# Patient Record
Sex: FEMALE | Race: WHITE | NOT HISPANIC OR LATINO | Employment: FULL TIME | RURAL
[De-identification: names, ages, dates, MRNs, and addresses within clinical notes are randomized per-mention and may not be internally consistent; named-entity substitution may affect disease eponyms.]

---

## 2017-01-11 ENCOUNTER — OFFICE VISIT (OUTPATIENT)
Dept: RETAIL CLINIC | Facility: CLINIC | Age: 42
End: 2017-01-11

## 2017-01-11 VITALS
WEIGHT: 293 LBS | DIASTOLIC BLOOD PRESSURE: 84 MMHG | HEIGHT: 62 IN | OXYGEN SATURATION: 97 % | TEMPERATURE: 98.2 F | BODY MASS INDEX: 53.92 KG/M2 | HEART RATE: 72 BPM | RESPIRATION RATE: 18 BRPM | SYSTOLIC BLOOD PRESSURE: 122 MMHG

## 2017-01-11 DIAGNOSIS — R14.2 BELCHING: ICD-10-CM

## 2017-01-11 DIAGNOSIS — H66.001 ACUTE SUPPURATIVE OTITIS MEDIA OF RIGHT EAR WITHOUT SPONTANEOUS RUPTURE OF TYMPANIC MEMBRANE, RECURRENCE NOT SPECIFIED: Primary | ICD-10-CM

## 2017-01-11 DIAGNOSIS — J06.9 ACUTE URI: ICD-10-CM

## 2017-01-11 PROCEDURE — 99213 OFFICE O/P EST LOW 20 MIN: CPT | Performed by: NURSE PRACTITIONER

## 2017-01-11 RX ORDER — RANITIDINE 150 MG/1
150 CAPSULE ORAL 2 TIMES DAILY
Qty: 20 CAPSULE | Refills: 0 | Status: SHIPPED | OUTPATIENT
Start: 2017-01-11 | End: 2018-01-11

## 2017-01-11 RX ORDER — AMOXICILLIN 500 MG/1
1000 CAPSULE ORAL 2 TIMES DAILY
Qty: 40 CAPSULE | Refills: 0 | Status: SHIPPED | OUTPATIENT
Start: 2017-01-11 | End: 2017-05-10

## 2017-01-11 NOTE — PATIENT INSTRUCTIONS
Hiatal Hernia  A hiatal hernia occurs when part of your stomach slides above the muscle that separates your abdomen from your chest (diaphragm). You can be born with a hiatal hernia (congenital), or it may develop over time. In almost all cases of hiatal hernia, only the top part of the stomach pushes through.   Many people have a hiatal hernia with no symptoms. The larger the hernia, the more likely that you will have symptoms. In some cases, a hiatal hernia allows stomach acid to flow back into the tube that carries food from your mouth to your stomach (esophagus). This may cause heartburn symptoms. Severe heartburn symptoms may mean you have developed a condition called gastroesophageal reflux disease (GERD).   CAUSES   Hiatal hernias are caused by a weakness in the opening (hiatus) where your esophagus passes through your diaphragm to attach to the upper part of your stomach. You may be born with a weakness in your hiatus, or a weakness can develop.  RISK FACTORS  Older age is a major risk factor for a hiatal hernia. Anything that increases pressure on your diaphragm can also increase your risk of a hiatal hernia. This includes:  · Pregnancy.  · Excess weight.  · Frequent constipation.  SIGNS AND SYMPTOMS   People with a hiatal hernia often have no symptoms. If symptoms develop, they are almost always caused by GERD. They may include:  · Heartburn.  · Belching.  · Indigestion.  · Trouble swallowing.  · Coughing or wheezing.   · Sore throat.  · Hoarseness.  · Chest pain.  DIAGNOSIS   A hiatal hernia is sometimes found during an exam for another problem. Your health care provider may suspect a hiatal hernia if you have symptoms of GERD. Tests may be done to diagnose GERD. These may include:  · X-rays of your stomach or chest.  · An upper gastrointestinal (GI) series. This is an X-ray exam of your GI tract involving the use of a chalky liquid that you swallow. The liquid shows up clearly on the X-ray.  · Endoscopy.  This is a procedure to look into your stomach using a thin, flexible tube that has a tiny camera and light on the end of it.  TREATMENT   If you have no symptoms, you may not need treatment. If you have symptoms, treatment may include:  · Dietary and lifestyle changes to help reduce GERD symptoms.  · Medicines. These may include:    Over-the-counter antacids.    Medicines that make your stomach empty more quickly.    Medicines that block the production of stomach acid (H2 blockers).    Stronger medicines to reduce stomach acid (proton pump inhibitors).  · You may need surgery to repair the hernia if other treatments are not helping.  HOME CARE INSTRUCTIONS   · Take all medicines as directed by your health care provider.  · Quit smoking, if you smoke.  · Try to achieve and maintain a healthy body weight.  · Eat frequent small meals instead of three large meals a day. This keeps your stomach from getting too full.    Eat slowly.    Do not lie down right after eating.     Do not eat 1-2 hours before bed.    · Do not drink beverages with caffeine. These include cola, coffee, cocoa, and tea.  · Do not drink alcohol.  · Avoid foods that can make symptoms of GERD worse. These may include:    Fatty foods.    Citrus fruits.    Other foods and drinks that contain acid.  · Avoid putting pressure on your belly. Anything that puts pressure on your belly increases the amount of acid that may be pushed up into your esophagus.      Avoid bending over, especially after eating.    Raise the head of your bed by putting blocks under the legs. This keeps your head and esophagus higher than your stomach.    Do not wear tight clothing around your chest or stomach.    Try not to strain when having a bowel movement, when urinating, or when lifting heavy objects.  SEEK MEDICAL CARE IF:  · Your symptoms are not controlled with medicines or lifestyle changes.  · You are having trouble swallowing.  · You have coughing or wheezing that will not  go away.  SEEK IMMEDIATE MEDICAL CARE IF:  · Your pain is getting worse.  · Your pain spreads to your arms, neck, jaw, teeth, or back.  · You have shortness of breath.  · You sweat for no reason.  · You feel sick to your stomach (nauseous) or vomit.  · You vomit blood.  · You have bright red blood in your stools.  · You have black, tarry stools.       This information is not intended to replace advice given to you by your health care provider. Make sure you discuss any questions you have with your health care provider.     Document Released: 03/09/2005 Document Revised: 01/08/2016 Document Reviewed: 12/05/2014  [employer] Interactive Patient Education ©2016 Elsevier Inc.  Otitis Media, Adult  Otitis media is redness, soreness, and inflammation of the middle ear. Otitis media may be caused by allergies or, most commonly, by infection. Often it occurs as a complication of the common cold.  SIGNS AND SYMPTOMS  Symptoms of otitis media may include:  · Earache.  · Fever.  · Ringing in your ear.  · Headache.  · Leakage of fluid from the ear.  DIAGNOSIS  To diagnose otitis media, your health care provider will examine your ear with an otoscope. This is an instrument that allows your health care provider to see into your ear in order to examine your eardrum. Your health care provider also will ask you questions about your symptoms.  TREATMENT   Typically, otitis media resolves on its own within 3-5 days. Your health care provider may prescribe medicine to ease your symptoms of pain. If otitis media does not resolve within 5 days or is recurrent, your health care provider may prescribe antibiotic medicines if he or she suspects that a bacterial infection is the cause.  HOME CARE INSTRUCTIONS   · If you were prescribed an antibiotic medicine, finish it all even if you start to feel better.  · Take medicines only as directed by your health care provider.  · Keep all follow-up visits as directed by your health care  provider.  SEEK MEDICAL CARE IF:  · You have otitis media only in one ear, or bleeding from your nose, or both.  · You notice a lump on your neck.  · You are not getting better in 3-5 days.  · You feel worse instead of better.  SEEK IMMEDIATE MEDICAL CARE IF:   · You have pain that is not controlled with medicine.  · You have swelling, redness, or pain around your ear or stiffness in your neck.  · You notice that part of your face is paralyzed.  · You notice that the bone behind your ear (mastoid) is tender when you touch it.  MAKE SURE YOU:   · Understand these instructions.  · Will watch your condition.  · Will get help right away if you are not doing well or get worse.     This information is not intended to replace advice given to you by your health care provider. Make sure you discuss any questions you have with your health care provider.     Document Released: 09/22/2005 Document Revised: 01/08/2016 Document Reviewed: 07/15/2014  Elsevier Interactive Patient Education ©2016 Elsevier Inc.

## 2017-01-11 NOTE — PROGRESS NOTES
Subjective   Ashley John is a 41 y.o. female.     HPI Comments: Also reports some chest pain and palpitations that she contributed to dehydration 2 days ago because she only drinks coffee and mountain dew.  Reports that she drank only water the last two days and that the symptoms subsided.      Earache    There is pain in the right ear. This is a new problem. Episode onset: x 2-3 days. The problem occurs every few hours. The problem has been unchanged. Maximum temperature: low grade. The fever has been present for 1 to 2 days. The pain is mild. Associated symptoms include abdominal pain and coughing ( mild). Pertinent negatives include no diarrhea, ear discharge, headaches, hearing loss, neck pain, rash, rhinorrhea, sore throat or vomiting. She has tried nothing for the symptoms.   Abdominal Pain   This is a new problem. Episode onset: x 2-3 days. The onset quality is gradual. Episode frequency: mainlly at night. The problem has been unchanged. The pain is located in the epigastric region. The pain is mild. The quality of the pain is aching. The abdominal pain does not radiate. Associated symptoms include belching, a fever ( low grade) and myalgias (x 2-3 days). Pertinent negatives include no anorexia, arthralgias, constipation, diarrhea, dysuria, flatus, frequency, headaches, hematuria, melena, nausea, vomiting or weight loss. Associated symptoms comments: Recently had stomach virus 2 weeks ago and felt like she was back to normal  . Exacerbated by: eating large meals. The pain is relieved by nothing. She has tried nothing for the symptoms.        The following portions of the patient's history were reviewed and updated as appropriate: allergies, current medications, past medical history and past social history.    Review of Systems   Constitutional: Positive for fever ( low grade). Negative for activity change, appetite change and weight loss.   HENT: Positive for congestion ( mild) and ear pain. Negative for  "ear discharge, hearing loss, rhinorrhea, sinus pressure, sneezing, sore throat and trouble swallowing.    Eyes: Negative.    Respiratory: Positive for cough ( mild). Negative for chest tightness and wheezing.    Cardiovascular: Positive for palpitations ( 2 days ago). Negative for chest pain and leg swelling.   Gastrointestinal: Positive for abdominal pain. Negative for anorexia, blood in stool, constipation, diarrhea, flatus, melena, nausea and vomiting. Abdominal distention:  reports bloating at night when belching occurs  or after eating.   Genitourinary: Negative for dysuria, frequency and hematuria.   Musculoskeletal: Positive for myalgias (x 2-3 days). Negative for arthralgias, neck pain and neck stiffness.   Skin: Negative.  Negative for rash.   Neurological: Positive for weakness. Negative for dizziness and headaches.   Hematological: Negative for adenopathy.   Psychiatric/Behavioral: Negative.        Objective    Visit Vitals   • /84   • Pulse 72   • Temp 98.2 °F (36.8 °C) (Tympanic)   • Resp 18   • Ht 62\" (157.5 cm)   • Wt (!) 332 lb (151 kg)   • LMP 12/31/2016 (LMP Unknown)   • SpO2 97%   • Breastfeeding No   • BMI 60.72 kg/m2       Physical Exam   Constitutional: She is oriented to person, place, and time. She appears well-developed and well-nourished. No distress.   HENT:   Head: Normocephalic and atraumatic.   Right Ear: Ear canal normal. Tympanic membrane is erythematous ( dull).   Left Ear: Tympanic membrane and ear canal normal.   Nose: Nose normal. Right sinus exhibits no maxillary sinus tenderness and no frontal sinus tenderness. Left sinus exhibits no maxillary sinus tenderness and no frontal sinus tenderness.   Mouth/Throat: Uvula is midline and mucous membranes are normal. Posterior oropharyngeal erythema ( throat injected, +PND) present.   Eyes: Conjunctivae are normal.   Neck: Normal range of motion. Neck supple.   Cardiovascular: Normal rate and regular rhythm.    Auscultated for full " minute     Pulmonary/Chest: Effort normal. She has no wheezes. She has no rales.   Loose, slightly congested cough       Abdominal: Soft. Bowel sounds are normal. There is tenderness ( mild TTP over epigastric region). There is no rebound and no guarding.   Lymphadenopathy:     She has no cervical adenopathy.   Neurological: She is alert and oriented to person, place, and time.   Psychiatric: She has a normal mood and affect. Her behavior is normal.   Nursing note and vitals reviewed.      Assessment/Plan   Ashley was seen today for earache.    Diagnoses and all orders for this visit:    Acute suppurative otitis media of right ear without spontaneous rupture of tympanic membrane, recurrence not specified  -     amoxicillin (AMOXIL) 500 MG capsule; Take 2 capsules by mouth 2 (Two) Times a Day.    Acute URI    Belching  -     ranitidine (ZANTAC) 150 MG capsule; Take 1 capsule by mouth 2 (Two) Times a Day.      Patient was in a hurry to leave but did stress to her that I was unable to r/o any cardiac cause of her symptoms and if they persisted or worsened to go to ER for evaluation and she verbalized understanding.  Also discussed the possibility of hiatal hernia and need for f/u with PCP.      Push fluids  Rest  [place], BRAT diet for the next week or two.    Do not eat/drink within 2-3 hours of lying down at night.  Limit caffeine consumption.      Recommended Mucinex OTC for URI/chest congestion symptoms.     RTW: Today

## 2017-01-11 NOTE — MR AVS SNAPSHOT
Ashley John   1/11/2017 11:00 AM   Office Visit    Dept Phone:  [phone]   Encounter #:  [id number]    Provider:  [facility]   Department:  [facility]                Your Full Care Plan              Today's Medication Changes          These changes are accurate as of: 1/11/17 12:16 PM.  If you have any questions, ask your nurse or doctor.               New Medication(s)Ordered:     amoxicillin 500 MG capsule   Commonly known as:  AMOXIL   Take 2 capsules by mouth 2 (Two) Times a Day.       ranitidine 150 MG capsule   Commonly known as:  ZANTAC   Take 1 capsule by mouth 2 (Two) Times a Day.            Where to Get Your Medications      These medications were sent to [facility] [address] [place] [address] - [phone] [facility] [phone] [address] [address]     Phone:  [phone]     amoxicillin 500 MG capsule    ranitidine 150 MG capsule                  Your Updated Medication List          This list is accurate as of: 1/11/17 12:16 PM.  Always use your most recent med list.                amoxicillin 500 MG capsule   Commonly known as:  AMOXIL   Take 2 capsules by mouth 2 (Two) Times a Day.       ranitidine 150 MG capsule   Commonly known as:  ZANTAC   Take 1 capsule by mouth 2 (Two) Times a Day.               You Were Diagnosed With        Codes Comments    Acute URI    -  Primary ICD-10-CM: J06.9  ICD-9-CM: 465.9     Acute suppurative otitis media of right ear without spontaneous rupture of tympanic membrane, recurrence not specified     ICD-10-CM: H66.001  ICD-9-CM: 382.00     Belching     ICD-10-CM: R14.2  ICD-9-CM: 787.3       Instructions    Hiatal Hernia  A hiatal hernia occurs when part of your stomach slides above the muscle that separates your abdomen from your chest (diaphragm). You can be born with a hiatal hernia (congenital), or it may develop over time. In almost all cases of hiatal hernia, only  the top part of the stomach pushes through.   Many people have a hiatal hernia with no symptoms. The larger the hernia, the more likely that you will have symptoms. In some cases, a hiatal hernia allows stomach acid to flow back into the tube that carries food from your mouth to your stomach (esophagus). This may cause heartburn symptoms. Severe heartburn symptoms may mean you have developed a condition called gastroesophageal reflux disease (GERD).   CAUSES   Hiatal hernias are caused by a weakness in the opening (hiatus) where your esophagus passes through your diaphragm to attach to the upper part of your stomach. You may be born with a weakness in your hiatus, or a weakness can develop.  RISK FACTORS  Older age is a major risk factor for a hiatal hernia. Anything that increases pressure on your diaphragm can also increase your risk of a hiatal hernia. This includes:  · Pregnancy.  · Excess weight.  · Frequent constipation.  SIGNS AND SYMPTOMS   People with a hiatal hernia often have no symptoms. If symptoms develop, they are almost always caused by GERD. They may include:  · Heartburn.  · Belching.  · Indigestion.  · Trouble swallowing.  · Coughing or wheezing.   · Sore throat.  · Hoarseness.  · Chest pain.  DIAGNOSIS   A hiatal hernia is sometimes found during an exam for another problem. Your health care provider may suspect a hiatal hernia if you have symptoms of GERD. Tests may be done to diagnose GERD. These may include:  · X-rays of your stomach or chest.  · An upper gastrointestinal (GI) series. This is an X-ray exam of your GI tract involving the use of a chalky liquid that you swallow. The liquid shows up clearly on the X-ray.  · Endoscopy. This is a procedure to look into your stomach using a thin, flexible tube that has a tiny camera and light on the end of it.  TREATMENT   If you have no symptoms, you may not need treatment. If you have symptoms, treatment may include:  · Dietary and lifestyle  changes to help reduce GERD symptoms.  · Medicines. These may include:    Over-the-counter antacids.    Medicines that make your stomach empty more quickly.    Medicines that block the production of stomach acid (H2 blockers).    Stronger medicines to reduce stomach acid (proton pump inhibitors).  · You may need surgery to repair the hernia if other treatments are not helping.  HOME CARE INSTRUCTIONS   · Take all medicines as directed by your health care provider.  · Quit smoking, if you smoke.  · Try to achieve and maintain a healthy body weight.  · Eat frequent small meals instead of three large meals a day. This keeps your stomach from getting too full.    Eat slowly.    Do not lie down right after eating.     Do not eat 1-2 hours before bed.    · Do not drink beverages with caffeine. These include cola, coffee, cocoa, and tea.  · Do not drink alcohol.  · Avoid foods that can make symptoms of GERD worse. These may include:    Fatty foods.    Citrus fruits.    Other foods and drinks that contain acid.  · Avoid putting pressure on your belly. Anything that puts pressure on your belly increases the amount of acid that may be pushed up into your esophagus.      Avoid bending over, especially after eating.    Raise the head of your bed by putting blocks under the legs. This keeps your head and esophagus higher than your stomach.    Do not wear tight clothing around your chest or stomach.    Try not to strain when having a bowel movement, when urinating, or when lifting heavy objects.  SEEK MEDICAL CARE IF:  · Your symptoms are not controlled with medicines or lifestyle changes.  · You are having trouble swallowing.  · You have coughing or wheezing that will not go away.  SEEK IMMEDIATE MEDICAL CARE IF:  · Your pain is getting worse.  · Your pain spreads to your arms, neck, jaw, teeth, or back.  · You have shortness of breath.  · You sweat for no reason.  · You feel sick to your stomach (nauseous) or vomit.  · You  vomit blood.  · You have bright red blood in your stools.  · You have black, tarry stools.       This information is not intended to replace advice given to you by your health care provider. Make sure you discuss any questions you have with your health care provider.     Document Released: 03/09/2005 Document Revised: 01/08/2016 Document Reviewed: 12/05/2014  [employer] Interactive Patient Education ©2016 Elsevier Inc.  Otitis Media, Adult  Otitis media is redness, soreness, and inflammation of the middle ear. Otitis media may be caused by allergies or, most commonly, by infection. Often it occurs as a complication of the common cold.  SIGNS AND SYMPTOMS  Symptoms of otitis media may include:  · Earache.  · Fever.  · Ringing in your ear.  · Headache.  · Leakage of fluid from the ear.  DIAGNOSIS  To diagnose otitis media, your health care provider will examine your ear with an otoscope. This is an instrument that allows your health care provider to see into your ear in order to examine your eardrum. Your health care provider also will ask you questions about your symptoms.  TREATMENT   Typically, otitis media resolves on its own within 3-5 days. Your health care provider may prescribe medicine to ease your symptoms of pain. If otitis media does not resolve within 5 days or is recurrent, your health care provider may prescribe antibiotic medicines if he or she suspects that a bacterial infection is the cause.  HOME CARE INSTRUCTIONS   · If you were prescribed an antibiotic medicine, finish it all even if you start to feel better.  · Take medicines only as directed by your health care provider.  · Keep all follow-up visits as directed by your health care provider.  SEEK MEDICAL CARE IF:  · You have otitis media only in one ear, or bleeding from your nose, or both.  · You notice a lump on your neck.  · You are not getting better in 3-5 days.  · You feel worse instead of better.  SEEK IMMEDIATE MEDICAL CARE IF:   · You  have pain that is not controlled with medicine.  · You have swelling, redness, or pain around your ear or stiffness in your neck.  · You notice that part of your face is paralyzed.  · You notice that the bone behind your ear (mastoid) is tender when you touch it.  MAKE SURE YOU:   · Understand these instructions.  · Will watch your condition.  · Will get help right away if you are not doing well or get worse.     This information is not intended to replace advice given to you by your health care provider. Make sure you discuss any questions you have with your health care provider.     Document Released: 2005 Document Revised: 2016 Document Reviewed: 07/15/2014  [employer] Interactive Patient Education © Elsevier Inc.       Patient Instructions History      Upcoming Appointments     Visit Type Date Time Department    OFFICE VISIT 2017 11:00 AM [facility] [place]      [facility] Signup     [employer] allows you to send messages to your doctor, view your test results, renew your prescriptions, schedule appointments, and more. To sign up, go to [employer] and click on the Sign Up Now link in the New User? box. Enter your [facility] Activation Code exactly as it appears below along with the last four digits of your Social Security Number and your Date of Birth () to complete the sign-up process. If you do not sign up before the expiration date, you must request a new code.    [facility] Activation Code: [email or web address]  Expires: 2017 12:16 PM    If you have questions, you can email [email or web address] or call [phone] to talk to our [facility] staff. Remember, [facility] is NOT to be used for urgent needs. For medical emergencies, dial 911.               Other Info from Your Visit           Allergies     No Known Allergies      Reason for Visit     Earache ears stopped up bloated when she eats belching when she lays down tired weak has heart beat weird drank water  "went away       Vital Signs     Blood Pressure Pulse Temperature Respirations Height Weight    122/84 72 98.2 °F (36.8 °C) (Tympanic) 18 62\" (157.5 cm) 332 lb (151 kg)    Last Menstrual Period Oxygen Saturation Breastfeeding? Body Mass Index Smoking Status       12/31/2016 (LMP Unknown) 97% No 60.72 kg/m2 Never Smoker       Problems and Diagnoses Noted     Acute upper respiratory infection    -  Primary    Middle ear infection        Belching            "

## 2017-01-11 NOTE — LETTER
January 11, 2017     Patient: Ashley John   YOB: 1975   Date of Visit: 1/11/2017       To Whom It May Concern:    It is my medical opinion that Ashley John may return to work on today.           Sincerely,        RENZO Gilman    PROVIDER [facility]    CC: No Recipients

## 2017-05-10 ENCOUNTER — OFFICE VISIT (OUTPATIENT)
Dept: OBSTETRICS AND GYNECOLOGY | Facility: CLINIC | Age: 42
End: 2017-05-10

## 2017-05-10 VITALS
SYSTOLIC BLOOD PRESSURE: 109 MMHG | HEART RATE: 86 BPM | HEIGHT: 62 IN | BODY MASS INDEX: 53.92 KG/M2 | WEIGHT: 293 LBS | DIASTOLIC BLOOD PRESSURE: 74 MMHG

## 2017-05-10 DIAGNOSIS — Z01.419 WELL WOMAN EXAM WITH ROUTINE GYNECOLOGICAL EXAM: Primary | ICD-10-CM

## 2017-05-10 DIAGNOSIS — E66.01 MORBID OBESITY WITH BMI OF 50.0-59.9, ADULT (HCC): Chronic | ICD-10-CM

## 2017-05-10 DIAGNOSIS — Z12.31 SCREENING MAMMOGRAM, ENCOUNTER FOR: ICD-10-CM

## 2017-05-10 PROCEDURE — 88142 CYTOPATH C/V THIN LAYER: CPT | Performed by: OBSTETRICS & GYNECOLOGY

## 2017-05-10 PROCEDURE — 87624 HPV HI-RISK TYP POOLED RSLT: CPT | Performed by: OBSTETRICS & GYNECOLOGY

## 2017-05-10 PROCEDURE — 99386 PREV VISIT NEW AGE 40-64: CPT | Performed by: OBSTETRICS & GYNECOLOGY

## 2017-05-10 RX ORDER — CETIRIZINE HYDROCHLORIDE 10 MG/1
10 TABLET ORAL DAILY
COMMUNITY
End: 2017-10-20 | Stop reason: SDUPTHER

## 2017-05-10 RX ORDER — LANOLIN ALCOHOL/MO/W.PET/CERES
1000 CREAM (GRAM) TOPICAL DAILY
COMMUNITY
End: 2017-10-20

## 2017-05-15 LAB
LAB AP CASE REPORT: NORMAL
LAB AP GYN ADDITIONAL INFORMATION: NORMAL
LAB AP GYN OTHER FINDINGS: NORMAL
Lab: NORMAL
PATH INTERP SPEC-IMP: NORMAL
STAT OF ADQ CVX/VAG CYTO-IMP: NORMAL

## 2017-05-17 LAB — HPV I/H RISK 4 DNA CVX QL PROBE+SIG AMP: NEGATIVE

## 2017-05-18 ENCOUNTER — OFFICE VISIT (OUTPATIENT)
Dept: RETAIL CLINIC | Facility: CLINIC | Age: 42
End: 2017-05-18

## 2017-05-18 VITALS
WEIGHT: 293 LBS | TEMPERATURE: 98.8 F | BODY MASS INDEX: 53.92 KG/M2 | OXYGEN SATURATION: 96 % | RESPIRATION RATE: 18 BRPM | SYSTOLIC BLOOD PRESSURE: 122 MMHG | DIASTOLIC BLOOD PRESSURE: 86 MMHG | HEART RATE: 89 BPM | HEIGHT: 62 IN

## 2017-05-18 DIAGNOSIS — H00.016 HORDEOLUM EXTERNUM OF LEFT EYE, UNSPECIFIED EYELID: ICD-10-CM

## 2017-05-18 DIAGNOSIS — J20.9 ACUTE BRONCHITIS, UNSPECIFIED ORGANISM: ICD-10-CM

## 2017-05-18 DIAGNOSIS — J01.91 ACUTE RECURRENT SINUSITIS, UNSPECIFIED LOCATION: Primary | ICD-10-CM

## 2017-05-18 PROCEDURE — 99213 OFFICE O/P EST LOW 20 MIN: CPT | Performed by: NURSE PRACTITIONER

## 2017-05-18 PROCEDURE — 96372 THER/PROPH/DIAG INJ SC/IM: CPT | Performed by: NURSE PRACTITIONER

## 2017-05-18 PROCEDURE — 94640 AIRWAY INHALATION TREATMENT: CPT | Performed by: NURSE PRACTITIONER

## 2017-05-18 RX ORDER — ALBUTEROL SULFATE 2.5 MG/3ML
2.5 SOLUTION RESPIRATORY (INHALATION) EVERY 4 HOURS PRN
Qty: 75 ML | Refills: 0 | Status: SHIPPED | OUTPATIENT
Start: 2017-05-18 | End: 2018-08-22

## 2017-05-18 RX ORDER — DEXAMETHASONE SODIUM PHOSPHATE 4 MG/ML
8 INJECTION, SOLUTION INTRA-ARTICULAR; INTRALESIONAL; INTRAMUSCULAR; INTRAVENOUS; SOFT TISSUE ONCE
Status: COMPLETED | OUTPATIENT
Start: 2017-05-18 | End: 2017-05-18

## 2017-05-18 RX ORDER — GUAIFENESIN AND DEXTROMETHORPHAN HYDROBROMIDE 600; 30 MG/1; MG/1
2 TABLET, EXTENDED RELEASE ORAL EVERY 12 HOURS SCHEDULED
Qty: 28 TABLET | Refills: 0 | Status: SHIPPED | OUTPATIENT
Start: 2017-05-18 | End: 2017-10-20

## 2017-05-18 RX ORDER — ALBUTEROL SULFATE 2.5 MG/3ML
2.5 SOLUTION RESPIRATORY (INHALATION) ONCE
Status: COMPLETED | OUTPATIENT
Start: 2017-05-18 | End: 2017-05-18

## 2017-05-18 RX ORDER — ALBUTEROL SULFATE 90 UG/1
2 AEROSOL, METERED RESPIRATORY (INHALATION) EVERY 4 HOURS PRN
Qty: 18 G | Refills: 0 | Status: SHIPPED | OUTPATIENT
Start: 2017-05-18 | End: 2018-10-08 | Stop reason: SDUPTHER

## 2017-05-18 RX ORDER — ALBUTEROL SULFATE 90 UG/1
2 AEROSOL, METERED RESPIRATORY (INHALATION) EVERY 4 HOURS PRN
COMMUNITY
End: 2017-05-18 | Stop reason: SDUPTHER

## 2017-05-18 RX ORDER — ERYTHROMYCIN 5 MG/G
OINTMENT OPHTHALMIC 3 TIMES DAILY
Qty: 1 G | Refills: 0 | Status: SHIPPED | OUTPATIENT
Start: 2017-05-18 | End: 2017-05-25

## 2017-05-18 RX ADMIN — DEXAMETHASONE SODIUM PHOSPHATE 8 MG: 4 INJECTION, SOLUTION INTRA-ARTICULAR; INTRALESIONAL; INTRAMUSCULAR; INTRAVENOUS; SOFT TISSUE at 19:02

## 2017-05-18 RX ADMIN — ALBUTEROL SULFATE 2.5 MG: 2.5 SOLUTION RESPIRATORY (INHALATION) at 19:03

## 2017-08-11 ENCOUNTER — LAB (OUTPATIENT)
Dept: LAB | Facility: CLINIC | Age: 42
End: 2017-08-11

## 2017-08-11 DIAGNOSIS — Z00.00 ENCOUNTER FOR SCREENING AND PREVENTATIVE CARE: ICD-10-CM

## 2017-08-11 LAB
25(OH)D3 SERPL-MCNC: 16.7 NG/ML (ref 30–100)
ALBUMIN SERPL-MCNC: 3.8 G/DL (ref 3.4–4.8)
ALBUMIN/GLOB SERPL: 1.4 G/DL (ref 1.1–1.8)
ALP SERPL-CCNC: 112 U/L (ref 38–126)
ALT SERPL W P-5'-P-CCNC: 31 U/L (ref 9–52)
ANION GAP SERPL CALCULATED.3IONS-SCNC: 9 MMOL/L (ref 5–15)
ARTICHOKE IGE QN: 143 MG/DL (ref 1–129)
AST SERPL-CCNC: 18 U/L (ref 14–36)
BASOPHILS # BLD AUTO: 0.02 10*3/MM3 (ref 0–0.2)
BASOPHILS NFR BLD AUTO: 0.2 % (ref 0–2)
BILIRUB SERPL-MCNC: 0.5 MG/DL (ref 0.2–1.3)
BUN BLD-MCNC: 11 MG/DL (ref 7–21)
BUN/CREAT SERPL: 17.7 (ref 7–25)
CALCIUM SPEC-SCNC: 9.4 MG/DL (ref 8.4–10.2)
CHLORIDE SERPL-SCNC: 102 MMOL/L (ref 95–110)
CHOLEST SERPL-MCNC: 196 MG/DL (ref 0–199)
CO2 SERPL-SCNC: 29 MMOL/L (ref 22–31)
CREAT BLD-MCNC: 0.62 MG/DL (ref 0.5–1)
DEPRECATED RDW RBC AUTO: 42 FL (ref 36.4–46.3)
EOSINOPHIL # BLD AUTO: 0.1 10*3/MM3 (ref 0–0.7)
EOSINOPHIL NFR BLD AUTO: 1 % (ref 0–7)
ERYTHROCYTE [DISTWIDTH] IN BLOOD BY AUTOMATED COUNT: 12.6 % (ref 11.5–14.5)
FOLATE SERPL-MCNC: 9.81 NG/ML (ref 2.76–21)
GFR SERPL CREATININE-BSD FRML MDRD: 107 ML/MIN/1.73 (ref 58–135)
GLOBULIN UR ELPH-MCNC: 2.8 GM/DL (ref 2.3–3.5)
GLUCOSE BLD-MCNC: 94 MG/DL (ref 60–100)
HCT VFR BLD AUTO: 46.2 % (ref 35–45)
HDLC SERPL-MCNC: 47 MG/DL (ref 60–200)
HGB BLD-MCNC: 15.6 G/DL (ref 12–15.5)
IMM GRANULOCYTES # BLD: 0.05 10*3/MM3 (ref 0–0.02)
IMM GRANULOCYTES NFR BLD: 0.5 % (ref 0–0.5)
LDLC/HDLC SERPL: 2.51 {RATIO} (ref 0–3.22)
LYMPHOCYTES # BLD AUTO: 3.16 10*3/MM3 (ref 0.6–4.2)
LYMPHOCYTES NFR BLD AUTO: 30.6 % (ref 10–50)
MCH RBC QN AUTO: 30.8 PG (ref 26.5–34)
MCHC RBC AUTO-ENTMCNC: 33.8 G/DL (ref 31.4–36)
MCV RBC AUTO: 91.3 FL (ref 80–98)
MONOCYTES # BLD AUTO: 0.65 10*3/MM3 (ref 0–0.9)
MONOCYTES NFR BLD AUTO: 6.3 % (ref 0–12)
NEUTROPHILS # BLD AUTO: 6.35 10*3/MM3 (ref 2–8.6)
NEUTROPHILS NFR BLD AUTO: 61.4 % (ref 37–80)
PLATELET # BLD AUTO: 276 10*3/MM3 (ref 150–450)
PMV BLD AUTO: 11.4 FL (ref 8–12)
POTASSIUM BLD-SCNC: 4.4 MMOL/L (ref 3.5–5.1)
PROT SERPL-MCNC: 6.6 G/DL (ref 6.3–8.6)
RBC # BLD AUTO: 5.06 10*6/MM3 (ref 3.77–5.16)
SODIUM BLD-SCNC: 140 MMOL/L (ref 137–145)
TRIGL SERPL-MCNC: 156 MG/DL (ref 20–199)
TSH SERPL DL<=0.05 MIU/L-ACNC: 1.82 MIU/ML (ref 0.46–4.68)
VIT B12 BLD-MCNC: 305 PG/ML (ref 239–931)
WBC NRBC COR # BLD: 10.33 10*3/MM3 (ref 3.2–9.8)

## 2017-08-11 PROCEDURE — 84443 ASSAY THYROID STIM HORMONE: CPT | Performed by: OBSTETRICS & GYNECOLOGY

## 2017-08-11 PROCEDURE — 36415 COLL VENOUS BLD VENIPUNCTURE: CPT | Performed by: FAMILY MEDICINE

## 2017-08-11 PROCEDURE — 82746 ASSAY OF FOLIC ACID SERUM: CPT | Performed by: OBSTETRICS & GYNECOLOGY

## 2017-08-11 PROCEDURE — 80061 LIPID PANEL: CPT | Performed by: OBSTETRICS & GYNECOLOGY

## 2017-08-11 PROCEDURE — 80053 COMPREHEN METABOLIC PANEL: CPT | Performed by: OBSTETRICS & GYNECOLOGY

## 2017-08-11 PROCEDURE — 82306 VITAMIN D 25 HYDROXY: CPT | Performed by: OBSTETRICS & GYNECOLOGY

## 2017-08-11 PROCEDURE — 85025 COMPLETE CBC W/AUTO DIFF WBC: CPT | Performed by: OBSTETRICS & GYNECOLOGY

## 2017-08-11 PROCEDURE — 82607 VITAMIN B-12: CPT | Performed by: OBSTETRICS & GYNECOLOGY

## 2017-08-11 PROCEDURE — 83525 ASSAY OF INSULIN: CPT | Performed by: OBSTETRICS & GYNECOLOGY

## 2017-08-14 LAB — INSULIN SERPL-ACNC: 21.5 UIU/ML (ref 2.6–24.9)

## 2017-10-20 ENCOUNTER — OFFICE VISIT (OUTPATIENT)
Dept: FAMILY MEDICINE CLINIC | Facility: CLINIC | Age: 42
End: 2017-10-20

## 2017-10-20 VITALS
OXYGEN SATURATION: 95 % | BODY MASS INDEX: 53.92 KG/M2 | SYSTOLIC BLOOD PRESSURE: 113 MMHG | HEART RATE: 81 BPM | HEIGHT: 62 IN | TEMPERATURE: 97.2 F | WEIGHT: 293 LBS | DIASTOLIC BLOOD PRESSURE: 76 MMHG

## 2017-10-20 DIAGNOSIS — J01.00 ACUTE MAXILLARY SINUSITIS, RECURRENCE NOT SPECIFIED: Primary | ICD-10-CM

## 2017-10-20 DIAGNOSIS — J20.9 ACUTE BRONCHITIS, UNSPECIFIED ORGANISM: ICD-10-CM

## 2017-10-20 PROCEDURE — 99213 OFFICE O/P EST LOW 20 MIN: CPT | Performed by: NURSE PRACTITIONER

## 2017-10-20 PROCEDURE — 96372 THER/PROPH/DIAG INJ SC/IM: CPT | Performed by: NURSE PRACTITIONER

## 2017-10-20 RX ORDER — TRIAMCINOLONE ACETONIDE 40 MG/ML
80 INJECTION, SUSPENSION INTRA-ARTICULAR; INTRAMUSCULAR ONCE
Status: COMPLETED | OUTPATIENT
Start: 2017-10-20 | End: 2017-10-20

## 2017-10-20 RX ORDER — CETIRIZINE HYDROCHLORIDE 10 MG/1
10 TABLET ORAL DAILY
Qty: 30 TABLET | Refills: 11 | Status: SHIPPED | OUTPATIENT
Start: 2017-10-20

## 2017-10-20 RX ORDER — AMOXICILLIN AND CLAVULANATE POTASSIUM 875; 125 MG/1; MG/1
1 TABLET, FILM COATED ORAL 2 TIMES DAILY
Qty: 20 TABLET | Refills: 0 | Status: SHIPPED | OUTPATIENT
Start: 2017-10-20 | End: 2018-08-22

## 2017-10-20 RX ORDER — FLUCONAZOLE 150 MG/1
150 TABLET ORAL ONCE
Qty: 1 TABLET | Refills: 0 | Status: SHIPPED | OUTPATIENT
Start: 2017-10-20 | End: 2017-10-20

## 2017-10-20 RX ORDER — MONTELUKAST SODIUM 10 MG/1
10 TABLET ORAL NIGHTLY
Qty: 30 TABLET | Refills: 2 | Status: SHIPPED | OUTPATIENT
Start: 2017-10-20 | End: 2018-08-22

## 2017-10-20 RX ADMIN — TRIAMCINOLONE ACETONIDE 80 MG: 40 INJECTION, SUSPENSION INTRA-ARTICULAR; INTRAMUSCULAR at 11:37

## 2017-10-20 NOTE — PATIENT INSTRUCTIONS
Acute Bronchitis  Bronchitis is inflammation of the airways that extend from the windpipe into the lungs (bronchi). The inflammation often causes mucus to develop. This leads to a cough, which is the most common symptom of bronchitis.   In acute bronchitis, the condition usually develops suddenly and goes away over time, usually in a couple weeks. Smoking, allergies, and asthma can make bronchitis worse. Repeated episodes of bronchitis may cause further lung problems.   CAUSES  Acute bronchitis is most often caused by the same virus that causes a cold. The virus can spread from person to person (contagious) through coughing, sneezing, and touching contaminated objects.  SIGNS AND SYMPTOMS   · Cough.    · Fever.    · Coughing up mucus.    · Body aches.    · Chest congestion.    · Chills.    · Shortness of breath.    · Sore throat.    DIAGNOSIS   Acute bronchitis is usually diagnosed through a physical exam. Your health care provider will also ask you questions about your medical history. Tests, such as chest X-rays, are sometimes done to rule out other conditions.   TREATMENT   Acute bronchitis usually goes away in a couple weeks. Oftentimes, no medical treatment is necessary. Medicines are sometimes given for relief of fever or cough. Antibiotic medicines are usually not needed but may be prescribed in certain situations. In some cases, an inhaler may be recommended to help reduce shortness of breath and control the cough. A cool mist vaporizer may also be used to help thin bronchial secretions and make it easier to clear the chest.   HOME CARE INSTRUCTIONS  · Get plenty of rest.    · Drink enough fluids to keep your urine clear or pale yellow (unless you have a medical condition that requires fluid restriction). Increasing fluids may help thin your respiratory secretions (sputum) and reduce chest congestion, and it will prevent dehydration.    · Take medicines only as directed by your health care provider.  · If  you were prescribed an antibiotic medicine, finish it all even if you start to feel better.  · Avoid smoking and secondhand smoke. Exposure to cigarette smoke or irritating chemicals will make bronchitis worse. If you are a smoker, consider using nicotine gum or skin patches to help control withdrawal symptoms. Quitting smoking will help your lungs heal faster.    · Reduce the chances of another bout of acute bronchitis by washing your hands frequently, avoiding people with cold symptoms, and trying not to touch your hands to your mouth, nose, or eyes.    · Keep all follow-up visits as directed by your health care provider.    SEEK MEDICAL CARE IF:  Your symptoms do not improve after 1 week of treatment.   SEEK IMMEDIATE MEDICAL CARE IF:  · You develop an increased fever or chills.    · You have chest pain.    · You have severe shortness of breath.  · You have bloody sputum.    · You develop dehydration.  · You faint or repeatedly feel like you are going to pass out.  · You develop repeated vomiting.  · You develop a severe headache.  MAKE SURE YOU:   · Understand these instructions.  · Will watch your condition.  · Will get help right away if you are not doing well or get worse.     This information is not intended to replace advice given to you by your health care provider. Make sure you discuss any questions you have with your health care provider.     Document Released: 01/25/2006 Document Revised: 01/08/2016 Document Reviewed: 06/10/2014  [employer] Interactive Patient Education ©2017 [employer]  Sinusitis, Adult  Sinusitis is soreness and inflammation of your sinuses. Sinuses are hollow spaces in the bones around your face. Your sinuses are located:  · Around your eyes.  · In the middle of your forehead.  · Behind your nose.  · In your cheekbones.  Your sinuses and nasal passages are lined with a stringy fluid (mucus). Mucus normally drains out of your sinuses. When your nasal tissues become inflamed or  swollen, the mucus can become trapped or blocked so air cannot flow through your sinuses. This allows bacteria, viruses, and funguses to grow, which leads to infection.  Sinusitis can develop quickly and last for 7-10 days (acute) or for more than 12 weeks (chronic). Sinusitis often develops after a cold.  CAUSES  This condition is caused by anything that creates swelling in the sinuses or stops mucus from draining, including:  · Allergies.  · Asthma.  · Bacterial or viral infection.  · Abnormally shaped bones between the nasal passages.  · Nasal growths that contain mucus (nasal polyps).  · Narrow sinus openings.  · Pollutants, such as chemicals or irritants in the air.  · A foreign object stuck in the nose.  · A fungal infection. This is rare.  RISK FACTORS  The following factors may make you more likely to develop this condition:  · Having allergies or asthma.  · Having had a recent cold or respiratory tract infection.  · Having structural deformities or blockages in your nose or sinuses.  · Having a weak immune system.  · Doing a lot of swimming or diving.  · Overusing nasal sprays.  · Smoking.  SYMPTOMS  The main symptoms of this condition are pain and a feeling of pressure around the affected sinuses. Other symptoms include:  · Upper toothache.  · Earache.  · Headache.  · Bad breath.  · Decreased sense of smell and taste.  · A cough that may get worse at night.  · Fatigue.  · Fever.  · Thick drainage from your nose. The drainage is often green and it may contain pus (purulent).  · Stuffy nose or congestion.  · Postnasal drip. This is when extra mucus collects in the throat or back of the nose.  · Swelling and warmth over the affected sinuses.  · Sore throat.  · Sensitivity to light.  DIAGNOSIS  This condition is diagnosed based on symptoms, a medical history, and a physical exam. To find out if your condition is acute or chronic, your health care provider may:  · Look in your nose for signs of nasal  polyps.  · Tap over the affected sinus to check for signs of infection.  · View the inside of your sinuses using an imaging device that has a light attached (endoscope).  If your health care provider suspects that you have chronic sinusitis, you may also:  · Be tested for allergies.  · Have a sample of mucus taken from your nose (nasal culture) and checked for bacteria.  · Have a mucus sample examined to see if your sinusitis is related to an allergy.  If your sinusitis does not respond to treatment and it lasts longer than 8 weeks, you may have an MRI or CT scan to check your sinuses. These scans also help to determine how severe your infection is.  In rare cases, a bone biopsy may be done to rule out more serious types of fungal sinus disease.  TREATMENT  Treatment for sinusitis depends on the cause and whether your condition is chronic or acute. If a virus is causing your sinusitis, your symptoms will go away on their own within 10 days. You may be given medicines to relieve your symptoms, including:  · Topical nasal decongestants. They shrink swollen nasal passages and let mucus drain from your sinuses.  · Antihistamines. These drugs block inflammation that is triggered by allergies. This can help to ease swelling in your nose and sinuses.  · Topical nasal corticosteroids. These are nasal sprays that ease inflammation and swelling in your nose and sinuses.  · Nasal saline washes. These rinses can help to get rid of thick mucus in your nose.  If your condition is caused by bacteria, you will be given an antibiotic medicine. If your condition is caused by a fungus, you will be given an antifungal medicine.  Surgery may be needed to correct underlying conditions, such as narrow nasal passages. Surgery may also be needed to remove polyps.  HOME CARE INSTRUCTIONS  Medicines  · Take, use, or apply over-the-counter and prescription medicines only as told by your health care provider. These may include nasal  sprays.  · If you were prescribed an antibiotic medicine, take it as told by your health care provider. Do not stop taking the antibiotic even if you start to feel better.  Hydrate and Humidify  · Drink enough water to keep your urine clear or pale yellow. Staying hydrated will help to thin your mucus.  · Use a cool mist humidifier to keep the humidity level in your home above 50%.  · Inhale steam for 10-15 minutes, 3-4 times a day or as told by your health care provider. You can do this in the bathroom while a hot shower is running.  · Limit your exposure to cool or dry air.  Rest  · Rest as much as possible.  · Sleep with your head raised (elevated).  · Make sure to get enough sleep each night.  General Instructions  · Apply a warm, moist washcloth to your face 3-4 times a day or as told by your health care provider. This will help with discomfort.  · Wash your hands often with soap and water to reduce your exposure to viruses and other germs. If soap and water are not available, use hand .  · Do not smoke. Avoid being around people who are smoking (secondhand smoke).  · Keep all follow-up visits as told by your health care provider. This is important.  SEEK MEDICAL CARE IF:  · You have a fever.  · Your symptoms get worse.  · Your symptoms do not improve within 10 days.  SEEK IMMEDIATE MEDICAL CARE IF:  · You have a severe headache.  · You have persistent vomiting.  · You have pain or swelling around your face or eyes.  · You have vision problems.  · You develop confusion.  · Your neck is stiff.  · You have trouble breathing.     This information is not intended to replace advice given to you by your health care provider. Make sure you discuss any questions you have with your health care provider.     Document Released: 12/18/2006 Document Revised: 04/10/2017 Document Reviewed: 10/12/2016  [employer] Interactive Patient Education ©2017 [employer]

## 2017-10-20 NOTE — PROGRESS NOTES
"Subjective   Ashley John is a 41 y.o. female.     Sinusitis   This is a new problem. Episode onset: x 2 weeks. The problem has been gradually worsening since onset. Maximum temperature: \"feverish\" about a week ago. Her pain is at a severity of 6/10 (chest). Associated symptoms include chills, congestion, coughing, headaches and sinus pressure ( maxillary). Pertinent negatives include no diaphoresis, ear pain, hoarse voice, neck pain, shortness of breath, sneezing, sore throat or swollen glands. Treatments tried: zyrtec. The treatment provided mild relief.   Cough   This is a new problem. The current episode started in the past 7 days. The problem has been gradually worsening (woke up in the middle of the night last night and had to use inhaler). The problem occurs hourly. The cough is non-productive. Associated symptoms include chest pain ( tight), chills, ear congestion, headaches, nasal congestion, postnasal drip, rhinorrhea ( thick, green) and wheezing ( since last night). Pertinent negatives include no ear pain, heartburn, hemoptysis, myalgias, rash, sore throat, shortness of breath, sweats or weight loss. Fever:  not measured at home. The symptoms are aggravated by lying down. She has tried a beta-agonist inhaler for the symptoms. The treatment provided mild relief. Her past medical history is significant for asthma and bronchitis.        The following portions of the patient's history were reviewed and updated as appropriate: allergies, current medications, past medical history, past social history, past surgical history and problem list.    Review of Systems   Constitutional: Positive for chills. Negative for appetite change, diaphoresis and weight loss. Fever:  not measured at home.   HENT: Positive for congestion, postnasal drip, rhinorrhea ( thick, green), sinus pain and sinus pressure ( maxillary). Negative for ear pain, hoarse voice, sneezing and sore throat.    Eyes: Negative for discharge and " "itching.   Respiratory: Positive for cough, chest tightness and wheezing ( since last night). Negative for hemoptysis and shortness of breath.    Cardiovascular: Positive for chest pain ( tight).   Gastrointestinal: Negative for diarrhea, heartburn, nausea and vomiting.   Musculoskeletal: Negative for myalgias and neck pain.   Skin: Negative for rash.   Neurological: Positive for headaches. Negative for dizziness.   Hematological: Negative for adenopathy.       Objective    /76 (BP Location: Left arm, Patient Position: Sitting, Cuff Size: Adult)  Pulse 81  Temp 97.2 °F (36.2 °C) (Tympanic)   Ht 62\" (157.5 cm)  Wt (!) 323 lb (147 kg)  LMP 09/29/2017  SpO2 95%  BMI 59.08 kg/m2    Physical Exam   Constitutional: She is oriented to person, place, and time. She appears well-developed and well-nourished. No distress.   HENT:   Head: Normocephalic and atraumatic.   Right Ear: Ear canal normal. Tympanic membrane is not erythematous. A middle ear effusion is present.   Left Ear: Tympanic membrane and ear canal normal.   Nose: Mucosal edema ( erythemic, swollen, stuffed) present. Right sinus exhibits maxillary sinus tenderness. Right sinus exhibits no frontal sinus tenderness. Left sinus exhibits maxillary sinus tenderness. Left sinus exhibits no frontal sinus tenderness.   Mouth/Throat: Uvula is midline and mucous membranes are normal. Posterior oropharyngeal erythema ( mildly injected with PND) present.   Eyes: Conjunctivae are normal.   Cardiovascular: Normal rate and regular rhythm.    Pulmonary/Chest: Effort normal. She has wheezes ( decreased aeration with occasional expiratory wheeze and congested cough). She has no rales.   Lymphadenopathy:     She has cervical adenopathy (tender on right side).   Neurological: She is alert and oriented to person, place, and time.   Nursing note and vitals reviewed.      Assessment/Plan   Ashley was seen today for sinusitis, cough and uri.    Diagnoses and all orders for " this visit:    Acute maxillary sinusitis, recurrence not specified  -     amoxicillin-clavulanate (AUGMENTIN) 875-125 MG per tablet; Take 1 tablet by mouth 2 (Two) Times a Day.  -     triamcinolone acetonide (KENALOG-40) injection 80 mg; Inject 2 mL into the shoulder, thigh, or buttocks 1 (One) Time.  -     cetirizine (zyrTEC) 10 MG tablet; Take 1 tablet by mouth Daily.  -     montelukast (SINGULAIR) 10 MG tablet; Take 1 tablet by mouth Every Night.    Acute bronchitis, unspecified organism  -     triamcinolone acetonide (KENALOG-40) injection 80 mg; Inject 2 mL into the shoulder, thigh, or buttocks 1 (One) Time.  -     montelukast (SINGULAIR) 10 MG tablet; Take 1 tablet by mouth Every Night.    Other orders  -     fluconazole (DIFLUCAN) 150 MG tablet; Take 1 tablet by mouth 1 (One) Time for 1 dose.    Push fluids  Rest  Tylenol or Motrin as needed    Continue with Albuterol nebs and/or inhaler for wheezing as needed    Kenalog 80mg IM x 1 in office  Rx for Augmentin, refill of Zyrtec  Will restart Singulair as this has helped significantly with asthma and allergy symptoms in the past.     See PCP or RTC if symptoms persist/worsen    RTW: 10-23-17

## 2018-08-22 ENCOUNTER — OFFICE VISIT (OUTPATIENT)
Dept: FAMILY MEDICINE CLINIC | Facility: CLINIC | Age: 43
End: 2018-08-22

## 2018-08-22 VITALS
WEIGHT: 293 LBS | HEART RATE: 91 BPM | SYSTOLIC BLOOD PRESSURE: 120 MMHG | TEMPERATURE: 98 F | OXYGEN SATURATION: 97 % | HEIGHT: 62 IN | DIASTOLIC BLOOD PRESSURE: 84 MMHG | BODY MASS INDEX: 53.92 KG/M2

## 2018-08-22 DIAGNOSIS — J01.90 ACUTE SINUSITIS, RECURRENCE NOT SPECIFIED, UNSPECIFIED LOCATION: Primary | ICD-10-CM

## 2018-08-22 DIAGNOSIS — R05.9 COUGH: ICD-10-CM

## 2018-08-22 DIAGNOSIS — R50.9 FEVER, UNSPECIFIED FEVER CAUSE: ICD-10-CM

## 2018-08-22 DIAGNOSIS — J20.9 ACUTE BRONCHITIS, UNSPECIFIED ORGANISM: ICD-10-CM

## 2018-08-22 DIAGNOSIS — M77.8 RIGHT WRIST TENDINITIS: ICD-10-CM

## 2018-08-22 PROCEDURE — 96372 THER/PROPH/DIAG INJ SC/IM: CPT | Performed by: NURSE PRACTITIONER

## 2018-08-22 PROCEDURE — 94640 AIRWAY INHALATION TREATMENT: CPT | Performed by: NURSE PRACTITIONER

## 2018-08-22 PROCEDURE — 99214 OFFICE O/P EST MOD 30 MIN: CPT | Performed by: NURSE PRACTITIONER

## 2018-08-22 RX ORDER — AMOXICILLIN AND CLAVULANATE POTASSIUM 875; 125 MG/1; MG/1
1 TABLET, FILM COATED ORAL EVERY 12 HOURS SCHEDULED
Qty: 20 TABLET | Refills: 0 | Status: SHIPPED | OUTPATIENT
Start: 2018-08-22 | End: 2018-10-02

## 2018-08-22 RX ORDER — IBUPROFEN 600 MG/1
TABLET ORAL
Refills: 0 | COMMUNITY
Start: 2018-08-14 | End: 2019-01-15

## 2018-08-22 RX ORDER — CEFTRIAXONE 1 G/1
1 INJECTION, POWDER, FOR SOLUTION INTRAMUSCULAR; INTRAVENOUS ONCE
Status: COMPLETED | OUTPATIENT
Start: 2018-08-22 | End: 2018-08-22

## 2018-08-22 RX ORDER — METHYLPREDNISOLONE 4 MG/1
TABLET ORAL
Qty: 1 EACH | Refills: 0 | Status: SHIPPED | OUTPATIENT
Start: 2018-08-22 | End: 2018-10-02

## 2018-08-22 RX ORDER — OFLOXACIN 3 MG/ML
SOLUTION AURICULAR (OTIC)
Refills: 0 | COMMUNITY
Start: 2018-08-14 | End: 2018-10-02

## 2018-08-22 RX ORDER — ALBUTEROL SULFATE 2.5 MG/3ML
2.5 SOLUTION RESPIRATORY (INHALATION) EVERY 4 HOURS PRN
Qty: 75 ML | Refills: 2 | Status: SHIPPED | OUTPATIENT
Start: 2018-08-22

## 2018-08-22 RX ORDER — ALBUTEROL SULFATE 2.5 MG/3ML
2.5 SOLUTION RESPIRATORY (INHALATION) ONCE
Status: COMPLETED | OUTPATIENT
Start: 2018-08-22 | End: 2018-08-22

## 2018-08-22 RX ADMIN — ALBUTEROL SULFATE 2.5 MG: 2.5 SOLUTION RESPIRATORY (INHALATION) at 09:40

## 2018-08-22 RX ADMIN — CEFTRIAXONE 1 G: 1 INJECTION, POWDER, FOR SOLUTION INTRAMUSCULAR; INTRAVENOUS at 10:09

## 2018-08-22 NOTE — PROGRESS NOTES
"Subjective   Ashley John is a 42 y.o. female.     [facility] Visit    PCP: Dr. Younger    CC: \"nasal congestion, cough, lightheaded, fever\"    Also c/o right wrist pain.  Does repetitive movement at work.  Pain worse at the end of the day.  Chiropractor recommended ibuprofen and wrist splint which has helped somewhat.  Denies injury.     Stopped Singulair because she though she was drying out too much.       Fever    This is a new problem. The current episode started yesterday. The problem occurs intermittently. The maximum temperature noted was 101 to 101.9 F. Associated symptoms include chest pain ( with cough), congestion, coughing, ear pain, headaches, muscle aches, a sore throat and wheezing. Pertinent negatives include no abdominal pain, diarrhea, nausea, rash, sleepiness, urinary pain or vomiting. She has tried NSAIDs (zyrtec, flonase, ofloxacin ear drops from [facility] two weeks ago at onset of symptoms) for the symptoms. The treatment provided mild (with ibuprofen--last dose last night) relief.   Cough   This is a new problem. Episode onset: x 2 weeks. The problem has been gradually worsening. The problem occurs every few minutes. The cough is productive of purulent sputum. Associated symptoms include chest pain ( with cough), ear congestion, ear pain, a fever, headaches, myalgias, nasal congestion, postnasal drip, rhinorrhea ( old blood yesterday, now thick yellow to green), a sore throat, shortness of breath and wheezing. Pertinent negatives include no chills, heartburn, hemoptysis, rash, sweats or weight loss. The symptoms are aggravated by lying down and exercise. Risk factors for lung disease include smoking/tobacco exposure. She has tried a beta-agonist inhaler for the symptoms. The treatment provided mild relief. Her past medical history is significant for bronchitis.   Sinus Problem   This is a new problem. Episode onset: x 2 weeks. Associated symptoms include congestion, coughing, ear " "pain, headaches, shortness of breath and a sore throat. Pertinent negatives include no chills. Treatments tried: zyrtec, flonase. The treatment provided no relief.        The following portions of the patient's history were reviewed and updated as appropriate: allergies, current medications, past medical history, past social history, past surgical history and problem list.    Review of Systems   Constitutional: Positive for fever. Negative for chills and unexpected weight loss.   HENT: Positive for congestion, ear pain, postnasal drip, rhinorrhea ( old blood yesterday, now thick yellow to green) and sore throat.    Respiratory: Positive for cough, shortness of breath and wheezing. Negative for hemoptysis.    Cardiovascular: Positive for chest pain ( with cough).   Gastrointestinal: Negative for abdominal pain, diarrhea, nausea and vomiting.   Genitourinary: Negative for dysuria.   Musculoskeletal: Positive for myalgias.   Skin: Negative for rash.       Objective    /84 (BP Location: Right arm, Patient Position: Sitting, Cuff Size: Adult)   Pulse 91   Temp 98 °F (36.7 °C) (Tympanic)   Ht 157.5 cm (62\")   Wt 136 kg (299 lb)   LMP 07/31/2018   SpO2 97%   Breastfeeding? No   BMI 54.69 kg/m²     Physical Exam   Constitutional: She is oriented to person, place, and time. She appears well-developed and well-nourished. Distressed:  no distress, but does not appear to feel well.   HENT:   Head: Normocephalic and atraumatic.   Right Ear: Ear canal normal. Tympanic membrane is erythematous (dull). A middle ear effusion ( small) is present.   Left Ear: Tympanic membrane and ear canal normal.   Nose: Mucosal edema, rhinorrhea ( purulent) and congestion present. Right sinus exhibits maxillary sinus tenderness and frontal sinus tenderness. Left sinus exhibits maxillary sinus tenderness and frontal sinus tenderness.   Mouth/Throat: Uvula is midline and mucous membranes are normal. Posterior oropharyngeal erythema ( " with pnd) present. No oropharyngeal exudate or posterior oropharyngeal edema.   Eyes: Conjunctivae are normal. Right eye exhibits no discharge. Left eye exhibits no discharge.   Neck: Neck supple.   Cardiovascular: Normal rate and regular rhythm.    Pulmonary/Chest: Effort normal. She has decreased breath sounds in the right upper field, the right middle field, the right lower field, the left upper field, the left middle field and the left lower field. She has wheezes in the right upper field, the right middle field, the right lower field, the left upper field, the left middle field and the left lower field. She has rales in the right upper field, the right middle field, the left upper field and the left middle field.   Albuterol neb given in office. Significantly improved aeration following treatment.    Musculoskeletal:        Right wrist: She exhibits tenderness (along ulunar aspect of wrist). She exhibits normal range of motion and no swelling.   Lymphadenopathy:     She has cervical adenopathy ( shotty).   Neurological: She is alert and oriented to person, place, and time.   Nursing note and vitals reviewed.    Xr Chest Pa & Lateral (in Office)    Result Date: 8/22/2018  1.  Evidence of old granulomatous disease. 2.  Otherwise unremarkable chest. Electronically signed by:  Germán Hall MD  8/22/2018 10:14 AM CDT Workstation: [id number]        Assessment/Plan   Ashley was seen today for nasal congestion, fever, cough and dizziness.    Diagnoses and all orders for this visit:    Acute sinusitis, recurrence not specified, unspecified location  -     amoxicillin-clavulanate (AUGMENTIN) 875-125 MG per tablet; Take 1 tablet by mouth Every 12 (Twelve) Hours.  -     cefTRIAXone (ROCEPHIN) injection 1 g; Inject 1 g into the appropriate muscle as directed by prescriber 1 (One) Time.  -     MethylPREDNISolone (MEDROL, JAVIER,) 4 MG tablet; Take as directed on package instructions.    Cough  -     XR Chest PA & Lateral (In  Office)  -     albuterol (PROVENTIL) nebulizer solution 0.083% 2.5 mg/3mL; Take 2.5 mg by nebulization 1 (One) Time.  -     Nebulizer Treatment    Fever, unspecified fever cause  -     XR Chest PA & Lateral (In Office)    Right wrist tendinitis    Acute bronchitis, unspecified organism  -     amoxicillin-clavulanate (AUGMENTIN) 875-125 MG per tablet; Take 1 tablet by mouth Every 12 (Twelve) Hours.  -     cefTRIAXone (ROCEPHIN) injection 1 g; Inject 1 g into the appropriate muscle as directed by prescriber 1 (One) Time.  -     albuterol (PROVENTIL) (2.5 MG/3ML) 0.083% nebulizer solution; Take 2.5 mg by nebulization Every 4 (Four) Hours As Needed for Wheezing.  -     MethylPREDNISolone (MEDROL, JAVIER,) 4 MG tablet; Take as directed on package instructions.      Push fluids  Rest  Tylenol or Motrin as needed  Rocephin 1 gm IM in office  Rx for Augmentin, Medrol, refill of albuterol nebs--begin every 4-6 hours and wean as symptoms improve  Has been using Honey at night for cough which has been beneficial  Smoking cessation encouraged    Continue with Ibuprofen, splint for wrist tendinitis as it has been beneficial  Ice as needed    See PCP or RTC if symptoms persist/worsen    RTW: wishes to RTW on 8-23-18

## 2018-10-02 ENCOUNTER — OFFICE VISIT (OUTPATIENT)
Dept: FAMILY MEDICINE CLINIC | Facility: CLINIC | Age: 43
End: 2018-10-02

## 2018-10-02 VITALS
DIASTOLIC BLOOD PRESSURE: 74 MMHG | WEIGHT: 293 LBS | OXYGEN SATURATION: 98 % | RESPIRATION RATE: 18 BRPM | HEART RATE: 74 BPM | HEIGHT: 62 IN | BODY MASS INDEX: 53.92 KG/M2 | TEMPERATURE: 98.1 F | SYSTOLIC BLOOD PRESSURE: 118 MMHG

## 2018-10-02 DIAGNOSIS — R10.11 RIGHT UPPER QUADRANT ABDOMINAL PAIN: Primary | ICD-10-CM

## 2018-10-02 LAB
BILIRUB BLD-MCNC: NEGATIVE MG/DL
CLARITY, POC: CLEAR
COLOR UR: YELLOW
GLUCOSE UR STRIP-MCNC: NEGATIVE MG/DL
KETONES UR QL: NEGATIVE
LEUKOCYTE EST, POC: ABNORMAL
NITRITE UR-MCNC: NEGATIVE MG/ML
PH UR: 6.5 [PH] (ref 5–8)
PROT UR STRIP-MCNC: ABNORMAL MG/DL
RBC # UR STRIP: NEGATIVE /UL
SP GR UR: 1.01 (ref 1–1.03)
UROBILINOGEN UR QL: NORMAL

## 2018-10-02 PROCEDURE — 81002 URINALYSIS NONAUTO W/O SCOPE: CPT | Performed by: NURSE PRACTITIONER

## 2018-10-02 PROCEDURE — 99214 OFFICE O/P EST MOD 30 MIN: CPT | Performed by: NURSE PRACTITIONER

## 2018-10-02 RX ORDER — DICYCLOMINE HCL 20 MG
TABLET ORAL
Qty: 120 TABLET | Refills: 3 | Status: SHIPPED | OUTPATIENT
Start: 2018-10-02 | End: 2019-01-15

## 2018-10-02 NOTE — PROGRESS NOTES
Subjective   Ashley John is a 42 y.o. female.     Here today for low back and abd pain that started over the weekend.  Has gotten worse and now radiates to her sides.   Denies nausea, vomiting or diarrhea.  Did take ibuprofen and it didn't help.  Sees the chiropractor regularly for back pain.      Abdominal Pain   This is a new problem. The current episode started in the past 7 days. The onset quality is undetermined. The problem occurs constantly. The problem has been unchanged. The pain is located in the RUQ. The pain is at a severity of 7/10. The pain is severe. The quality of the pain is aching, colicky and dull. The abdominal pain radiates to the back. Pertinent negatives include no anorexia, arthralgias, belching, constipation, diarrhea, dysuria, fever, flatus, frequency, headaches, hematochezia, hematuria, melena, myalgias, nausea, vomiting or weight loss. Nothing aggravates the pain. The pain is relieved by nothing. She has tried nothing for the symptoms. The treatment provided no relief.        The following portions of the patient's history were reviewed and updated as appropriate: allergies, current medications, past family history, past medical history, past social history, past surgical history and problem list.    Review of Systems   Constitutional: Negative.  Negative for fever and unexpected weight loss.   HENT: Negative.    Respiratory: Negative.    Cardiovascular: Negative.    Gastrointestinal: Positive for abdominal pain. Negative for anorexia, constipation, diarrhea, flatus, hematochezia, melena, nausea and vomiting.   Genitourinary: Negative for dysuria, frequency and hematuria.   Musculoskeletal: Negative.  Negative for arthralgias and myalgias.   Skin: Negative.    Neurological: Negative.    Psychiatric/Behavioral: Negative.        Objective   Physical Exam   Constitutional: She is oriented to person, place, and time. She appears well-developed and well-nourished. No distress.   HENT:    Head: Normocephalic and atraumatic.   Right Ear: External ear normal.   Left Ear: External ear normal.   Mouth/Throat: Oropharynx is clear and moist. No oropharyngeal exudate.   Eyes: Pupils are equal, round, and reactive to light.   Neck: Normal range of motion. Neck supple. No thyromegaly present.   Cardiovascular: Normal rate, regular rhythm and normal heart sounds.  Exam reveals no friction rub.    No murmur heard.  Pulmonary/Chest: Effort normal and breath sounds normal. No respiratory distress. She has no wheezes. She has no rales.   Abdominal: Soft. Bowel sounds are normal. She exhibits no distension. There is tenderness (right upper quad).   Musculoskeletal: Normal range of motion.   Neurological: She is alert and oriented to person, place, and time.   Skin: Skin is warm and dry.   Psychiatric: She has a normal mood and affect. Thought content normal.   Nursing note and vitals reviewed.        Assessment/Plan   Ashley was seen today for abdominal pain.    Diagnoses and all orders for this visit:    Right upper quadrant abdominal pain  -     dicyclomine (BENTYL) 20 MG tablet; 30 minutes before meals and at bedtime.  -     US Gallbladder; Future  -     POCT urinalysis dipstick, manual      Brief Urine Lab Results  (Last result in the past 365 days)      Color   Clarity   Blood   Leuk Est   Nitrite   Protein   CREAT   Urine HCG        10/02/18 0943 Yellow Clear Negative Trace(A) Negative Trace(A)

## 2018-10-08 ENCOUNTER — OFFICE VISIT (OUTPATIENT)
Dept: FAMILY MEDICINE CLINIC | Facility: CLINIC | Age: 43
End: 2018-10-08

## 2018-10-08 VITALS
HEART RATE: 84 BPM | WEIGHT: 293 LBS | RESPIRATION RATE: 18 BRPM | BODY MASS INDEX: 53.92 KG/M2 | DIASTOLIC BLOOD PRESSURE: 71 MMHG | OXYGEN SATURATION: 94 % | SYSTOLIC BLOOD PRESSURE: 105 MMHG | HEIGHT: 62 IN | TEMPERATURE: 98.3 F

## 2018-10-08 DIAGNOSIS — M25.531 RIGHT WRIST PAIN: ICD-10-CM

## 2018-10-08 DIAGNOSIS — R10.11 RIGHT UPPER QUADRANT ABDOMINAL PAIN: Primary | ICD-10-CM

## 2018-10-08 DIAGNOSIS — R10.11 RIGHT UPPER QUADRANT ABDOMINAL PAIN: ICD-10-CM

## 2018-10-08 DIAGNOSIS — J45.909 ASTHMA, UNSPECIFIED ASTHMA SEVERITY, UNSPECIFIED WHETHER COMPLICATED, UNSPECIFIED WHETHER PERSISTENT: ICD-10-CM

## 2018-10-08 PROCEDURE — 99214 OFFICE O/P EST MOD 30 MIN: CPT | Performed by: NURSE PRACTITIONER

## 2018-10-08 RX ORDER — ALBUTEROL SULFATE 90 UG/1
2 AEROSOL, METERED RESPIRATORY (INHALATION) EVERY 4 HOURS PRN
Qty: 18 G | Refills: 3 | Status: SHIPPED | OUTPATIENT
Start: 2018-10-08

## 2018-10-08 NOTE — PROGRESS NOTES
Subjective   Ashley John is a 42 y.o. female.     Here today to establish.  Saw her last week with right upper quad abd pain.  A gallbladder u/s was done and was negative.  She cont to have discomfort.  She also hit her right wrist on a door and has had pain ever since.  She was told she might have tendinitis, but was never x rayed.    She has a hx of asthma and needs refills on her inhaler.      Abdominal Pain   This is a recurrent problem. The current episode started 1 to 4 weeks ago. The onset quality is undetermined. The problem occurs constantly. The problem has been waxing and waning. The pain is located in the RUQ. The pain is at a severity of 3/10. The pain is mild. The quality of the pain is aching, colicky and cramping. The abdominal pain radiates to the RUQ. Associated symptoms include arthralgias, belching and flatus. Pertinent negatives include no anorexia, constipation, diarrhea, dysuria, frequency, hematochezia, hematuria, melena, myalgias, nausea or weight loss. Nothing aggravates the pain. The pain is relieved by nothing. She has tried H2 blockers for the symptoms. The treatment provided no relief. Prior workup: u/s of gallbladder.   Hand Pain    The incident occurred more than 1 week ago. The incident occurred at home. Injury mechanism: hit it on a door. Pain location: right hand, ulnar side. The quality of the pain is described as aching. The pain radiates to the right arm. The pain is at a severity of 5/10. The pain is moderate. The pain has been fluctuating since the incident. Pertinent negatives include no chest pain, muscle weakness, numbness or tingling. The symptoms are aggravated by movement. Treatments tried: has used a proflex splint. The treatment provided mild relief.        The following portions of the patient's history were reviewed and updated as appropriate: allergies, current medications, past family history, past medical history, past social history, past surgical history and  problem list.    Review of Systems   Constitutional: Negative.  Negative for unexpected weight loss.   Respiratory: Negative.    Cardiovascular: Negative.  Negative for chest pain.   Gastrointestinal: Positive for abdominal pain and flatus. Negative for anorexia, constipation, diarrhea, hematochezia, melena and nausea.   Genitourinary: Negative for dysuria, frequency and hematuria.   Musculoskeletal: Positive for arthralgias. Negative for myalgias.        Right hand pain   Skin: Negative.    Neurological: Negative.  Negative for tingling and numbness.   Psychiatric/Behavioral: Negative.        Objective   Physical Exam   Constitutional: She is oriented to person, place, and time. She appears well-developed and well-nourished. No distress.   HENT:   Head: Normocephalic.   Eyes: Pupils are equal, round, and reactive to light.   Neck: Normal range of motion. Neck supple. No thyromegaly present.   Cardiovascular: Normal rate, regular rhythm and normal heart sounds.  Exam reveals no friction rub.    No murmur heard.  Pulmonary/Chest: Effort normal and breath sounds normal. No respiratory distress. She has no wheezes. She has no rales.   Abdominal: Soft.   Musculoskeletal: Normal range of motion.        Right hand: She exhibits tenderness (ulnar side). She exhibits normal range of motion and no bony tenderness.   X ray of right hand are negative for fractures or dislocations.   Neurological: She is alert and oriented to person, place, and time.   Skin: Skin is warm and dry.   Psychiatric: She has a normal mood and affect. Thought content normal.   Nursing note and vitals reviewed.        Assessment/Plan   Ashley was seen today for abdominal pain and hand pain.    Diagnoses and all orders for this visit:    Right upper quadrant abdominal pain  -     Ambulatory Referral to Gastroenterology    Asthma, unspecified asthma severity, unspecified whether complicated, unspecified whether persistent  -     albuterol (PROVENTIL  HFA;VENTOLIN HFA) 108 (90 Base) MCG/ACT inhaler; Inhale 2 puffs Every 4 (Four) Hours As Needed for Wheezing.    Right wrist pain  -     XR Wrist 3+ View Right (In Office)

## 2018-11-05 ENCOUNTER — TELEPHONE (OUTPATIENT)
Dept: FAMILY MEDICINE CLINIC | Facility: CLINIC | Age: 43
End: 2018-11-05

## 2018-12-18 ENCOUNTER — OFFICE VISIT (OUTPATIENT)
Dept: GASTROENTEROLOGY | Facility: CLINIC | Age: 43
End: 2018-12-18

## 2018-12-18 VITALS
WEIGHT: 293 LBS | HEIGHT: 62 IN | DIASTOLIC BLOOD PRESSURE: 72 MMHG | BODY MASS INDEX: 53.92 KG/M2 | SYSTOLIC BLOOD PRESSURE: 116 MMHG | HEART RATE: 76 BPM

## 2018-12-18 DIAGNOSIS — R19.4 CHANGE IN BOWEL HABITS: ICD-10-CM

## 2018-12-18 DIAGNOSIS — E55.9 VITAMIN D DEFICIENCY: ICD-10-CM

## 2018-12-18 DIAGNOSIS — R19.7 DIARRHEA, UNSPECIFIED TYPE: ICD-10-CM

## 2018-12-18 DIAGNOSIS — R10.10 PAIN OF UPPER ABDOMEN: Primary | ICD-10-CM

## 2018-12-18 DIAGNOSIS — K21.9 GASTROESOPHAGEAL REFLUX DISEASE, ESOPHAGITIS PRESENCE NOT SPECIFIED: ICD-10-CM

## 2018-12-18 DIAGNOSIS — R68.81 EARLY SATIETY: ICD-10-CM

## 2018-12-18 PROCEDURE — 99214 OFFICE O/P EST MOD 30 MIN: CPT | Performed by: PHYSICIAN ASSISTANT

## 2018-12-18 RX ORDER — SODIUM, POTASSIUM,MAG SULFATES 17.5-3.13G
SOLUTION, RECONSTITUTED, ORAL ORAL
Qty: 1 BOTTLE | Refills: 0 | Status: ON HOLD | OUTPATIENT
Start: 2018-12-18 | End: 2019-01-21

## 2018-12-18 RX ORDER — DEXTROSE AND SODIUM CHLORIDE 5; .45 G/100ML; G/100ML
30 INJECTION, SOLUTION INTRAVENOUS CONTINUOUS PRN
Status: CANCELLED | OUTPATIENT
Start: 2019-01-21

## 2018-12-18 NOTE — PATIENT INSTRUCTIONS

## 2018-12-18 NOTE — PROGRESS NOTES
Chief Complaint   Patient presents with   • RUQ Abd Pain     Ref. DO MULLER       ENDO PROCEDURE ORDERED: EGD/COLON gerd, anemias, abd pain, diarrhea, early satiety    Subjective    Ashley John is a 42 y.o. female. 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 Patient has had a right upper quadrant ultrasound at [facility] on 10/03/2018 showing normal liver and gallbladder. Urinalysis on 10/025/2018 showed trace abnormalities. Otherwise her last laboratories were done by Dr. Younger on 08/11/2017, showed normal folate, vitamin D low at 16.7, B12 low normal at 305. Normal TSH, cholesterol, CMP, insulin level. CBC showed white count 10.33, hemoglobin 15.36, hematocrit 46.2, otherwise normal.    Patient states she has had pain primarily in the right upper quadrant for many years. She feels a flipping-like sensation under her ribs, if she rubs the area it does help. Now she states anything she eats causes bloating, swelling, pain. Beans seem to cause worse pain, green vegetables also cause her to have more pain. On her worst days she has right upper quadrant pain that radiates across the left upper quadrant. She has to push on her abdomen until she can belch and it does help reduce some of her fullness and bloating. She really complains of not much of an appetite but has only lost 1 pound since she saw Lucinda in 10/2018, she claims to have lost 24 pounds this year because of this. She has never had endoscopy. She does take Bentyl before meals and states it does help her pain but not the fullness. She has variable bowel movements alternating between diarrhea and formed stools. She states usually she has more diarrhea when her pain is worse. Patient does take B12 and vitamin B12 orally every  day.    Patient is a pack a day smoker for 20 years, strongly encouraged to quit. Denied alcohol, illicit substance use. She denies past surgical history, has history of asthma and pneumonia. Family history of lung cancer, diabetes, heart disease, ulcers, polycythemia, gallbladder disease, hypertension, nervous problems, allergies. Father has had ulcers, otherwise in good health. Mother and spouse in good health,  since . One brother, 2 sisters, 3 children, all in good health.    ASSESSMENT/PLAN: Patient with abdominal pain, variable bowel habits with suspected irritable bowel, certainly inflammatory bowel disease cannot be excluded, previous laboratories showed B12 and vitamin D, mildly deficient, would consider food allergy, did recommend hepatitis diagnostic panel, [facility] FibroSure, vitamin D level, CBC, CMP, amylase, lipase, B12, iron studies, GI distress panel, alpha-gal testing. Would consider HIDA scan but I did recommend EGD/colonoscopy. Will do biopsies to evaluate for H. pylori, will make sure she does not have a colitis, consider early diverticular disease. I encouraged her to continue dietary modification and weight loss. Will see her in followup after the above, further pending clinical course and the results of the above.     Thank you very much, Lucinda, for this consultation and for allowing us to participate in the care of your patient. Will keep you informed.        The following portions of the patient's history were reviewed and updated as appropriate:   Past Medical History:   Diagnosis Date   • Fibrocystic breast    • Morbid obesity with BMI of 50.0-59.9, adult (CMS/[facility]) 5/10/2017     History reviewed. No pertinent surgical history.  Family History   Problem Relation Age of Onset   • Breast cancer Maternal Aunt      OB History      Para Term  AB Living    3 3 3          SAB TAB Ectopic Molar Multiple Live Births                       No Known Allergies  Social History  "    Socioeconomic History   • Marital status:      Spouse name: Not on file   • Number of children: Not on file   • Years of education: Not on file   • Highest education level: Not on file   Tobacco Use   • Smoking status: Current Every Day Smoker     Packs/day: 1.00     Types: Cigarettes   • Smokeless tobacco: Never Used   Substance and Sexual Activity   • Alcohol use: No   • Drug use: No   • Sexual activity: Defer       Current Outpatient Medications:   •  albuterol (PROVENTIL HFA;VENTOLIN HFA) 108 (90 Base) MCG/ACT inhaler, Inhale 2 puffs Every 4 (Four) Hours As Needed for Wheezing., Disp: 18 g, Rfl: 3  •  albuterol (PROVENTIL) (2.5 MG/3ML) 0.083% nebulizer solution, Take 2.5 mg by nebulization Every 4 (Four) Hours As Needed for Wheezing., Disp: 75 mL, Rfl: 2  •  cetirizine (zyrTEC) 10 MG tablet, Take 1 tablet by mouth Daily., Disp: 30 tablet, Rfl: 11  •  dicyclomine (BENTYL) 20 MG tablet, 30 minutes before meals and at bedtime., Disp: 120 tablet, Rfl: 3  •  ibuprofen (ADVIL,MOTRIN) 600 MG tablet, TK 1 T PO Q 8 H PRN, Disp: , Rfl: 0  •  sodium-potassium-magnesium sulfates (SUPREP BOWEL PREP KIT) 17.5-3.13-1.6 GM/177ML solution oral solution, As directed per instruction sheet for colonoscopy, Disp: 1 bottle, Rfl: 0  Review of Systems  Review of Systems   Constitutional: Positive for unexpected weight change.   HENT: Negative for trouble swallowing.    Gastrointestinal: Positive for abdominal pain, diarrhea and nausea. Negative for abdominal distention, anal bleeding, blood in stool, constipation, rectal pain and vomiting.   Genitourinary: Negative for difficulty urinating.   Musculoskeletal: Negative for back pain.   Neurological: Negative for dizziness.   All other systems reviewed and are negative.         Objective    /72 (BP Location: Left arm)   Pulse 76   Ht 157.5 cm (62\")   Wt (!) 137 kg (303 lb)   BMI 55.42 kg/m²   Physical Exam   Constitutional: She is oriented to person, place, and time. " She appears well-developed and well-nourished. No distress.   HENT:   Head: Normocephalic and atraumatic.   Eyes: EOM are normal. Pupils are equal, round, and reactive to light.   Neck: Normal range of motion.   Cardiovascular: Normal rate, regular rhythm and normal heart sounds.   Pulmonary/Chest: Effort normal and breath sounds normal.   Abdominal: Soft. Bowel sounds are normal. She exhibits no shifting dullness, no distension, no abdominal bruit, no ascites and no mass. There is no hepatosplenomegaly. There is tenderness. There is no rigidity, no rebound, no guarding and no CVA tenderness. No hernia. Hernia confirmed negative in the ventral area.   Obese, mild diffuse   Musculoskeletal: Normal range of motion.   Neurological: She is alert and oriented to person, place, and time.   Skin: Skin is warm and dry.   Psychiatric: She has a normal mood and affect. Her behavior is normal. Judgment and thought content normal.   Nursing note and vitals reviewed.    Assessment/Plan      1. Pain of upper abdomen    2. Gastroesophageal reflux disease, esophagitis presence not specified    3. Diarrhea, unspecified type    4. Vitamin D deficiency    5. Change in bowel habits    6. Early satiety    .   Ashley was seen today for ruq abd pain.    Diagnoses and all orders for this visit:    Pain of upper abdomen  -     Case Request; Standing  -     dextrose 5 % and sodium chloride 0.45 % infusion; Infuse 30 mL/hr into a venous catheter Continuous As Needed (Start Prior to Procedure).  -     Case Request  -     Recurrent Gastrointestinal Distress  -     Alpha - Gal Panel; Future  -     Comprehensive Metabolic Panel  -     CBC Auto Differential  -     TSH  -     Iron and TIBC  -     Ferritin  -     ABRAMS Fibrosure  -     Hepatitis Panel, Acute  -     Protime-INR  -     Vitamin D 25 Hydroxy  -     Vitamin B12  -     Amylase  -     Lipase    Gastroesophageal reflux disease, esophagitis presence not specified  -     Case Request;  Standing  -     dextrose 5 % and sodium chloride 0.45 % infusion; Infuse 30 mL/hr into a venous catheter Continuous As Needed (Start Prior to Procedure).  -     Case Request  -     Recurrent Gastrointestinal Distress  -     Alpha - Gal Panel; Future  -     Comprehensive Metabolic Panel  -     CBC Auto Differential  -     TSH  -     Iron and TIBC  -     Ferritin  -     ABRAMS Fibrosure  -     Hepatitis Panel, Acute  -     Protime-INR  -     Vitamin D 25 Hydroxy  -     Vitamin B12  -     Amylase  -     Lipase    Diarrhea, unspecified type  -     Case Request; Standing  -     dextrose 5 % and sodium chloride 0.45 % infusion; Infuse 30 mL/hr into a venous catheter Continuous As Needed (Start Prior to Procedure).  -     Case Request  -     Recurrent Gastrointestinal Distress  -     Alpha - Gal Panel; Future  -     Comprehensive Metabolic Panel  -     CBC Auto Differential  -     TSH  -     Iron and TIBC  -     Ferritin  -     ABRAMS Fibrosure  -     Hepatitis Panel, Acute  -     Protime-INR  -     Vitamin D 25 Hydroxy  -     Vitamin B12  -     Amylase  -     Lipase    Vitamin D deficiency  -     Case Request; Standing  -     dextrose 5 % and sodium chloride 0.45 % infusion; Infuse 30 mL/hr into a venous catheter Continuous As Needed (Start Prior to Procedure).  -     Case Request  -     Recurrent Gastrointestinal Distress  -     Alpha - Gal Panel; Future  -     Comprehensive Metabolic Panel  -     CBC Auto Differential  -     TSH  -     Iron and TIBC  -     Ferritin  -     ABRAMS Fibrosure  -     Hepatitis Panel, Acute  -     Protime-INR  -     Vitamin D 25 Hydroxy  -     Vitamin B12  -     Amylase  -     Lipase    Change in bowel habits  -     Case Request; Standing  -     dextrose 5 % and sodium chloride 0.45 % infusion; Infuse 30 mL/hr into a venous catheter Continuous As Needed (Start Prior to Procedure).  -     Case Request  -     Recurrent Gastrointestinal Distress  -     Alpha - Gal Panel; Future  -     Comprehensive  Metabolic Panel  -     CBC Auto Differential  -     TSH  -     Iron and TIBC  -     Ferritin  -     ABRAMS Fibrosure  -     Hepatitis Panel, Acute  -     Protime-INR  -     Vitamin D 25 Hydroxy  -     Vitamin B12  -     Amylase  -     Lipase    Early satiety  -     Case Request; Standing  -     dextrose 5 % and sodium chloride 0.45 % infusion; Infuse 30 mL/hr into a venous catheter Continuous As Needed (Start Prior to Procedure).  -     Case Request  -     Recurrent Gastrointestinal Distress  -     Alpha - Gal Panel; Future  -     Comprehensive Metabolic Panel  -     CBC Auto Differential  -     TSH  -     Iron and TIBC  -     Ferritin  -     ABRAMS Fibrosure  -     Hepatitis Panel, Acute  -     Protime-INR  -     Vitamin D 25 Hydroxy  -     Vitamin B12  -     Amylase  -     Lipase    Other orders  -     Follow Anesthesia Guidelines / Standing Orders; Future  -     Obtain Informed Consent; Standing  -     POC Glucose Once; Standing  -     sodium-potassium-magnesium sulfates (SUPREP BOWEL PREP KIT) 17.5-3.13-1.6 GM/177ML solution oral solution; As directed per instruction sheet for colonoscopy        Orders placed during this encounter include:  Orders Placed This Encounter   Procedures   • Recurrent Gastrointestinal Distress   • Alpha - Gal Panel     Standing Status:   Future     Standing Expiration Date:   12/18/2019   • Comprehensive Metabolic Panel   • CBC Auto Differential   • TSH   • Iron and TIBC   • Ferritin   • ABRAMS Fibrosure   • Hepatitis Panel, Acute   • Protime-INR   • Vitamin D 25 Hydroxy   • Vitamin B12   • Amylase   • Lipase   • Follow Anesthesia Guidelines / Standing Orders     Standing Status:   Future       Medications prescribed:  New Medications Ordered This Visit   Medications   • sodium-potassium-magnesium sulfates (SUPREP BOWEL PREP KIT) 17.5-3.13-1.6 GM/177ML solution oral solution     Sig: As directed per instruction sheet for colonoscopy     Dispense:  1 bottle     Refill:  0     Please dispense  1 suprep bowel prep kit       Requested Prescriptions     Signed Prescriptions Disp Refills   • sodium-potassium-magnesium sulfates (SUPREP BOWEL PREP KIT) 17.5-3.13-1.6 GM/177ML solution oral solution 1 bottle 0     Sig: As directed per instruction sheet for colonoscopy       Review and/or summary of lab tests, radiology, procedures, medications. Review and summary of old records and obtaining of history. The risks and benefits of my recommendations, as well as other treatment options were discussed with the patient today. Questions were answered.    Follow-up: Return if symptoms worsen or fail to improve, for After the above.     ESOPHAGOGASTRODUODENOSCOPY--bx (N/A), COLONOSCOPY--bx (N/A)      This document has been electronically signed by Carlos Kenney PA-C on December 28, 2018 1:25 PM      Results for orders placed or performed in visit on 10/02/18   POCT urinalysis dipstick, manual   Result Value Ref Range    Color Yellow Yellow, Straw, Dark Yellow, Anita    Clarity, UA Clear Clear    Glucose, UA Negative Negative, 1000 mg/dL (3+) mg/dL    Bilirubin Negative Negative    Ketones, UA Negative Negative    Specific Gravity  1.010 1.005 - 1.030    Blood, UA Negative Negative    pH, Urine 6.5 5.0 - 8.0    Protein, POC Trace (A) Negative mg/dL    Urobilinogen, UA Normal Normal    Leukocytes Trace (A) Negative    Nitrite, UA Negative Negative   Results for orders placed or performed in visit on 08/11/17   CBC Auto Differential   Result Value Ref Range    WBC 10.33 (H) 3.20 - 9.80 10*3/mm3    RBC 5.06 3.77 - 5.16 10*6/mm3    Hemoglobin 15.6 (H) 12.0 - 15.5 g/dL    Hematocrit 46.2 (H) 35.0 - 45.0 %    MCV 91.3 80.0 - 98.0 fL    MCH 30.8 26.5 - 34.0 pg    MCHC 33.8 31.4 - 36.0 g/dL    RDW 12.6 11.5 - 14.5 %    RDW-SD 42.0 36.4 - 46.3 fl    MPV 11.4 8.0 - 12.0 fL    Platelets 276 150 - 450 10*3/mm3    Neutrophil % 61.4 37.0 - 80.0 %    Lymphocyte % 30.6 10.0 - 50.0 %    Monocyte % 6.3 0.0 - 12.0 %    Eosinophil % 1.0  0.0 - 7.0 %    Basophil % 0.2 0.0 - 2.0 %    Immature Grans % 0.5 0.0 - 0.5 %    Neutrophils, Absolute 6.35 2.00 - 8.60 10*3/mm3    Lymphocytes, Absolute 3.16 0.60 - 4.20 10*3/mm3    Monocytes, Absolute 0.65 0.00 - 0.90 10*3/mm3    Eosinophils, Absolute 0.10 0.00 - 0.70 10*3/mm3    Basophils, Absolute 0.02 0.00 - 0.20 10*3/mm3    Immature Grans, Absolute 0.05 (H) 0.00 - 0.02 10*3/mm3   Vitamin D 25 Hydroxy   Result Value Ref Range    25 Hydroxy, Vitamin D 16.7 (L) 30.0 - 100.0 ng/ml   Insulin, Total   Result Value Ref Range    Insulin 21.5 2.6 - 24.9 uIU/mL   TSH   Result Value Ref Range    TSH 1.820 0.460 - 4.680 mIU/mL   Folate   Result Value Ref Range    Folate 9.81 2.76 - 21.00 ng/mL   Vitamin B12   Result Value Ref Range    Vitamin B-12 305 239 - 931 pg/mL   Lipid Panel   Result Value Ref Range    Total Cholesterol 196 0 - 199 mg/dL    Triglycerides 156 20 - 199 mg/dL    HDL Cholesterol 47 (L) 60 - 200 mg/dL    LDL Cholesterol  143 (H) 1 - 129 mg/dL    LDL/HDL Ratio 2.51 0.00 - 3.22   Comprehensive Metabolic Panel   Result Value Ref Range    Glucose 94 60 - 100 mg/dL    BUN 11 7 - 21 mg/dL    Creatinine 0.62 0.50 - 1.00 mg/dL    Sodium 140 137 - 145 mmol/L    Potassium 4.4 3.5 - 5.1 mmol/L    Chloride 102 95 - 110 mmol/L    CO2 29.0 22.0 - 31.0 mmol/L    Calcium 9.4 8.4 - 10.2 mg/dL    Total Protein 6.6 6.3 - 8.6 g/dL    Albumin 3.80 3.40 - 4.80 g/dL    ALT (SGPT) 31 9 - 52 U/L    AST (SGOT) 18 14 - 36 U/L    Alkaline Phosphatase 112 38 - 126 U/L    Total Bilirubin 0.5 0.2 - 1.3 mg/dL    eGFR Non  Amer 107 58 - 135 mL/min/1.73    Globulin 2.8 2.3 - 3.5 gm/dL    A/G Ratio 1.4 1.1 - 1.8 g/dL    BUN/Creatinine Ratio 17.7 7.0 - 25.0    Anion Gap 9.0 5.0 - 15.0 mmol/L   Results for orders placed or performed in visit on 05/10/17   HPV DNA Probe, Direct   Result Value Ref Range    HPV Aptima Negative Negative   Liquid-based Pap Smear, Screening   Result Value Ref Range    Case Report       Gynecologic Cytology  Report                       Case: [id number]                                  Authorizing Provider:  Jayme Younger MD           Collected:           05/10/2017 03:18 PM          Ordering Location:     [facility]     Received:            05/10/2017 03:24 PM                                 [facility]                                                                 First Screen:          Candis Johnson                                                              Specimen:    Liquid-Based Pap, Screening, Cervix, Endocervix                                            Interpretation Negative for intraepithelial lesion or malignancy      Other Findings       Shift in ramin suggestive of bacterial vaginosis  Mild inflammation    Specimen Adequacy       Satisfactory for evaluation, endocervical/transformation zone component present    Additional Information       Disclaimer: Cervical cytology is a screening test primarily for squamous cancer and its precursors and has associated false-negative and false-positive results.  Technologies such as liquid-based preparations may decrease but will not eliminate all false-negative results.  Follow-up of unexplained clinical signs and symptoms is recommended to minimize false-negative results. (The [place] System for Reporting Cervical Cytology: [place], 2015).      Embedded Images     Results for orders placed or performed in visit on 12/05/07   Converted (Historical) Gyn Cytology   Result Value Ref Range    Spec Descr 1 SPECIMEN(S): Cervical/Endocervical     Clinical Information       CLINICAL HISTORY: Reason for Pap Smear: Routine Smear, LMP: No LMP  Provided by Clinician, Post Partum      Comment         SPECIMEN ADEQUACY:  Satisfactory for evaluation:  endocervical/transformation zone component  present.    INTERPRETATION:  Negative for intraepithelial lesion or malignancy.      CONVERTED (HISTORICAL) FINAL PATHOLOGIST       Diagnostician:  BRENDA SANCHEZ  CT([facility])  Cytotechnologist  Electronically Signed 12/07/2007      ICD9 Diagnosis Code 1 ICD-9: V24.2        Some portions of this note have been dictated using voice recognition software and may contain errors and/or omissions.

## 2019-01-09 ENCOUNTER — LAB (OUTPATIENT)
Dept: LAB | Facility: HOSPITAL | Age: 44
End: 2019-01-09

## 2019-01-09 DIAGNOSIS — R68.81 EARLY SATIETY: ICD-10-CM

## 2019-01-09 DIAGNOSIS — K21.9 GASTROESOPHAGEAL REFLUX DISEASE, ESOPHAGITIS PRESENCE NOT SPECIFIED: ICD-10-CM

## 2019-01-09 DIAGNOSIS — R19.4 CHANGE IN BOWEL HABITS: ICD-10-CM

## 2019-01-09 DIAGNOSIS — R10.10 PAIN OF UPPER ABDOMEN: ICD-10-CM

## 2019-01-09 DIAGNOSIS — R19.7 DIARRHEA, UNSPECIFIED TYPE: ICD-10-CM

## 2019-01-09 DIAGNOSIS — E55.9 VITAMIN D DEFICIENCY: ICD-10-CM

## 2019-01-09 PROCEDURE — 85025 COMPLETE CBC W/AUTO DIFF WBC: CPT | Performed by: PHYSICIAN ASSISTANT

## 2019-01-09 PROCEDURE — 86003 ALLG SPEC IGE CRUDE XTRC EA: CPT | Performed by: PHYSICIAN ASSISTANT

## 2019-01-09 PROCEDURE — 86003 ALLG SPEC IGE CRUDE XTRC EA: CPT

## 2019-01-09 PROCEDURE — 82172 ASSAY OF APOLIPOPROTEIN: CPT | Performed by: PHYSICIAN ASSISTANT

## 2019-01-09 PROCEDURE — 83690 ASSAY OF LIPASE: CPT | Performed by: PHYSICIAN ASSISTANT

## 2019-01-09 PROCEDURE — 83883 ASSAY NEPHELOMETRY NOT SPEC: CPT | Performed by: PHYSICIAN ASSISTANT

## 2019-01-09 PROCEDURE — 83550 IRON BINDING TEST: CPT | Performed by: PHYSICIAN ASSISTANT

## 2019-01-09 PROCEDURE — 83516 IMMUNOASSAY NONANTIBODY: CPT | Performed by: PHYSICIAN ASSISTANT

## 2019-01-09 PROCEDURE — 82728 ASSAY OF FERRITIN: CPT | Performed by: PHYSICIAN ASSISTANT

## 2019-01-09 PROCEDURE — 85610 PROTHROMBIN TIME: CPT | Performed by: PHYSICIAN ASSISTANT

## 2019-01-09 PROCEDURE — 84478 ASSAY OF TRIGLYCERIDES: CPT | Performed by: PHYSICIAN ASSISTANT

## 2019-01-09 PROCEDURE — 82977 ASSAY OF GGT: CPT | Performed by: PHYSICIAN ASSISTANT

## 2019-01-09 PROCEDURE — 80074 ACUTE HEPATITIS PANEL: CPT | Performed by: PHYSICIAN ASSISTANT

## 2019-01-09 PROCEDURE — 85007 BL SMEAR W/DIFF WBC COUNT: CPT | Performed by: PHYSICIAN ASSISTANT

## 2019-01-09 PROCEDURE — 82465 ASSAY BLD/SERUM CHOLESTEROL: CPT | Performed by: PHYSICIAN ASSISTANT

## 2019-01-09 PROCEDURE — 86008 ALLG SPEC IGE RECOMB EA: CPT

## 2019-01-09 PROCEDURE — 80053 COMPREHEN METABOLIC PANEL: CPT | Performed by: PHYSICIAN ASSISTANT

## 2019-01-09 PROCEDURE — 83010 ASSAY OF HAPTOGLOBIN QUANT: CPT | Performed by: PHYSICIAN ASSISTANT

## 2019-01-09 PROCEDURE — 83540 ASSAY OF IRON: CPT | Performed by: PHYSICIAN ASSISTANT

## 2019-01-09 PROCEDURE — 82150 ASSAY OF AMYLASE: CPT | Performed by: PHYSICIAN ASSISTANT

## 2019-01-10 LAB
ALBUMIN SERPL-MCNC: 4.8 G/DL (ref 3.4–4.8)
ALBUMIN/GLOB SERPL: 1.5 G/DL (ref 1.1–1.8)
ALP SERPL-CCNC: 131 U/L (ref 38–126)
ALT SERPL W P-5'-P-CCNC: 29 U/L (ref 9–52)
AMYLASE SERPL-CCNC: 110 U/L (ref 50–130)
ANION GAP SERPL CALCULATED.3IONS-SCNC: 14 MMOL/L (ref 5–15)
AST SERPL-CCNC: 23 U/L (ref 14–36)
BASOPHILS # BLD AUTO: 0.04 10*3/MM3 (ref 0–0.2)
BASOPHILS NFR BLD AUTO: 0.2 % (ref 0–2)
BILIRUB SERPL-MCNC: 0.4 MG/DL (ref 0.2–1.3)
BUN BLD-MCNC: 10 MG/DL (ref 7–21)
BUN/CREAT SERPL: 15.6 (ref 7–25)
CALCIUM SPEC-SCNC: 9.7 MG/DL (ref 8.4–10.2)
CHLORIDE SERPL-SCNC: 99 MMOL/L (ref 95–110)
CO2 SERPL-SCNC: 23 MMOL/L (ref 22–31)
CREAT BLD-MCNC: 0.64 MG/DL (ref 0.5–1)
DEPRECATED RDW RBC AUTO: 39.7 FL (ref 36.4–46.3)
EOSINOPHIL # BLD AUTO: 0.1 10*3/MM3 (ref 0–0.7)
EOSINOPHIL NFR BLD AUTO: 0.6 % (ref 0–7)
ERYTHROCYTE [DISTWIDTH] IN BLOOD BY AUTOMATED COUNT: 12.5 % (ref 11.5–14.5)
FERRITIN SERPL-MCNC: 99.8 NG/ML (ref 6.2–137)
GFR SERPL CREATININE-BSD FRML MDRD: 102 ML/MIN/1.73 (ref 58–135)
GLOBULIN UR ELPH-MCNC: 3.1 GM/DL (ref 2.3–3.5)
GLUCOSE BLD-MCNC: 93 MG/DL (ref 60–100)
HCT VFR BLD AUTO: 46.7 % (ref 35–45)
HGB BLD-MCNC: 16.8 G/DL (ref 12–15.5)
IMM GRANULOCYTES # BLD AUTO: 0.15 10*3/MM3 (ref 0–0.02)
IMM GRANULOCYTES NFR BLD AUTO: 0.9 % (ref 0–0.5)
INR PPP: 1 (ref 0.8–1.2)
IRON 24H UR-MRATE: 101 MCG/DL (ref 37–170)
IRON SATN MFR SERPL: 28 % (ref 15–50)
LARGE PLATELETS: NORMAL
LIPASE SERPL-CCNC: 184 U/L (ref 23–300)
LYMPHOCYTES # BLD AUTO: 4.54 10*3/MM3 (ref 0.6–4.2)
LYMPHOCYTES NFR BLD AUTO: 27.8 % (ref 10–50)
MCH RBC QN AUTO: 31.6 PG (ref 26.5–34)
MCHC RBC AUTO-ENTMCNC: 36 G/DL (ref 31.4–36)
MCV RBC AUTO: 87.8 FL (ref 80–98)
MONOCYTES # BLD AUTO: 0.63 10*3/MM3 (ref 0–0.9)
MONOCYTES NFR BLD AUTO: 3.9 % (ref 0–12)
NEUTROPHILS # BLD AUTO: 10.9 10*3/MM3 (ref 2–8.6)
NEUTROPHILS NFR BLD AUTO: 66.6 % (ref 37–80)
NRBC BLD AUTO-RTO: 0 /100 WBC (ref 0–0)
PLATELET # BLD AUTO: 239 10*3/MM3 (ref 150–450)
PMV BLD AUTO: 11.7 FL (ref 8–12)
POTASSIUM BLD-SCNC: 4.2 MMOL/L (ref 3.5–5.1)
PROT SERPL-MCNC: 7.9 G/DL (ref 6.3–8.6)
PROTHROMBIN TIME: 13 SECONDS (ref 11.1–15.3)
RBC # BLD AUTO: 5.32 10*6/MM3 (ref 3.77–5.16)
RBC MORPH BLD: NORMAL
SMALL PLATELETS BLD QL SMEAR: ADEQUATE
SODIUM BLD-SCNC: 136 MMOL/L (ref 137–145)
TIBC SERPL-MCNC: 365 MCG/DL (ref 265–497)
WBC MORPH BLD: NORMAL
WBC NRBC COR # BLD: 16.36 10*3/MM3 (ref 3.2–9.8)

## 2019-01-11 LAB
25(OH)D3 SERPL-MCNC: 48.1 NG/ML (ref 30–100)
GLIADIN PEPTIDE IGA SER-ACNC: 4 UNITS (ref 0–19)
GLIADIN PEPTIDE IGG SER-ACNC: 3 UNITS (ref 0–19)
HAV IGM SERPL QL IA: NEGATIVE
HBV CORE IGM SERPL QL IA: NEGATIVE
HBV SURFACE AG SERPL QL IA: NEGATIVE
HCV AB SER DONR QL: NEGATIVE
TSH SERPL DL<=0.05 MIU/L-ACNC: 2.01 MIU/ML (ref 0.46–4.68)
TTG IGA SER-ACNC: <2 U/ML (ref 0–3)
TTG IGG SER-ACNC: <2 U/ML (ref 0–5)
VIT B12 BLD-MCNC: 407 PG/ML (ref 239–931)

## 2019-01-11 PROCEDURE — 82306 VITAMIN D 25 HYDROXY: CPT | Performed by: PHYSICIAN ASSISTANT

## 2019-01-11 PROCEDURE — 82607 VITAMIN B-12: CPT | Performed by: PHYSICIAN ASSISTANT

## 2019-01-11 PROCEDURE — 84443 ASSAY THYROID STIM HORMONE: CPT | Performed by: PHYSICIAN ASSISTANT

## 2019-01-12 LAB
A2 MACROGLOB SERPL-MCNC: 252 MG/DL (ref 110–276)
ALT SERPL W P-5'-P-CCNC: 24 IU/L (ref 0–40)
APO A-I SERPL-MCNC: 141 MG/DL (ref 116–209)
AST SERPL W P-5'-P-CCNC: 21 IU/L (ref 0–40)
BILIRUB SERPL-MCNC: 0.3 MG/DL (ref 0–1.2)
CHOLEST SERPL-MCNC: 213 MG/DL (ref 100–199)
FIBROSIS SCORING:: ABNORMAL
FIBROSIS STAGE SERPL QL: ABNORMAL
GGT SERPL-CCNC: 54 IU/L (ref 0–60)
GLUCOSE SERPL-MCNC: 88 MG/DL (ref 65–99)
HAPTOGLOB SERPL-MCNC: 395 MG/DL (ref 34–200)
INTERPRETATIONS: (REFERENCE): ABNORMAL
LABORATORY COMMENT REPORT: ABNORMAL
LIMITATIONS: (REFERENCE): ABNORMAL
LIVER FIBR SCORE SERPL CALC.FIBROSURE: 0.11 (ref 0–0.21)
NASH GRADE (REFERENCE): ABNORMAL
NASH SCORE (REFERENCE): 0.5
NASH SCORING (REFERENCE): ABNORMAL
STEATOSIS GRADE (REFERENCE): ABNORMAL
STEATOSIS GRADING (REFERENCE): ABNORMAL
STEATOSIS SCORE (REFERENCE): 0.65 (ref 0–0.3)
TRIGL SERPL-MCNC: 204 MG/DL (ref 0–149)
WEIGHT: (REFERENCE): 303 LBS

## 2019-01-14 LAB
ALPHA GAL IGE: 0.14 KU/L
BEEF IGE QN: <0.1 KU/L
CALIF WALNUT POLN IGE QN: <0.1 KU/L
CODFISH IGE QN: <0.1 KU/L
CONV CLASS DESCRIPTION: NORMAL
COW MILK IGE QN: <0.1 KU/L
EGG WHITE IGE QN: <0.1 KU/L
GLUTEN IGE QN: <0.1 KU/L
HAZELNUT IGE QN: <0.1 KU/L
LAMB IGE QN: <0.1 KU/L
Lab: 0
PEANUT IGE QN: <0.1 KU/L
PORK IGE: <0.1 KU/L
SCALLOP IGE QN: <0.1 KU/L
SESAME SEED IGE: <0.1 KU/L
SHRIMP IGE: <0.1 KU/L
SOYBEAN IGE QN: <0.1 KU/L
WHEAT IGE QN: <0.1 KU/L

## 2019-01-15 RX ORDER — UBIDECARENONE 75 MG
50 CAPSULE ORAL DAILY
COMMUNITY

## 2019-01-15 RX ORDER — ERGOCALCIFEROL (VITAMIN D2) 10 MCG
400 TABLET ORAL DAILY
COMMUNITY

## 2019-01-21 ENCOUNTER — ANESTHESIA (OUTPATIENT)
Dept: GASTROENTEROLOGY | Facility: HOSPITAL | Age: 44
End: 2019-01-21

## 2019-01-21 ENCOUNTER — HOSPITAL ENCOUNTER (OUTPATIENT)
Facility: HOSPITAL | Age: 44
Setting detail: HOSPITAL OUTPATIENT SURGERY
Discharge: HOME | End: 2019-01-21
Attending: INTERNAL MEDICINE | Admitting: INTERNAL MEDICINE

## 2019-01-21 ENCOUNTER — ANESTHESIA EVENT (OUTPATIENT)
Dept: GASTROENTEROLOGY | Facility: HOSPITAL | Age: 44
End: 2019-01-21

## 2019-01-21 VITALS
WEIGHT: 293 LBS | HEART RATE: 82 BPM | OXYGEN SATURATION: 98 % | RESPIRATION RATE: 17 BRPM | HEIGHT: 62 IN | TEMPERATURE: 97.5 F | DIASTOLIC BLOOD PRESSURE: 81 MMHG | SYSTOLIC BLOOD PRESSURE: 131 MMHG | BODY MASS INDEX: 53.92 KG/M2

## 2019-01-21 DIAGNOSIS — E55.9 VITAMIN D DEFICIENCY: ICD-10-CM

## 2019-01-21 DIAGNOSIS — R10.10 PAIN OF UPPER ABDOMEN: ICD-10-CM

## 2019-01-21 DIAGNOSIS — R19.7 DIARRHEA, UNSPECIFIED TYPE: ICD-10-CM

## 2019-01-21 DIAGNOSIS — R68.81 EARLY SATIETY: ICD-10-CM

## 2019-01-21 DIAGNOSIS — R19.4 CHANGE IN BOWEL HABITS: ICD-10-CM

## 2019-01-21 DIAGNOSIS — K21.9 GASTROESOPHAGEAL REFLUX DISEASE, ESOPHAGITIS PRESENCE NOT SPECIFIED: ICD-10-CM

## 2019-01-21 PROCEDURE — 88341 IMHCHEM/IMCYTCHM EA ADD ANTB: CPT | Performed by: PATHOLOGY

## 2019-01-21 PROCEDURE — 88342 IMHCHEM/IMCYTCHM 1ST ANTB: CPT | Performed by: PATHOLOGY

## 2019-01-21 PROCEDURE — 43239 EGD BIOPSY SINGLE/MULTIPLE: CPT | Performed by: INTERNAL MEDICINE

## 2019-01-21 PROCEDURE — 88342 IMHCHEM/IMCYTCHM 1ST ANTB: CPT | Performed by: INTERNAL MEDICINE

## 2019-01-21 PROCEDURE — 25010000002 PROPOFOL 10 MG/ML EMULSION: Performed by: NURSE ANESTHETIST, CERTIFIED REGISTERED

## 2019-01-21 PROCEDURE — 45385 COLONOSCOPY W/LESION REMOVAL: CPT | Performed by: INTERNAL MEDICINE

## 2019-01-21 PROCEDURE — 88305 TISSUE EXAM BY PATHOLOGIST: CPT | Performed by: INTERNAL MEDICINE

## 2019-01-21 PROCEDURE — 88305 TISSUE EXAM BY PATHOLOGIST: CPT | Performed by: PATHOLOGY

## 2019-01-21 PROCEDURE — 88341 IMHCHEM/IMCYTCHM EA ADD ANTB: CPT | Performed by: INTERNAL MEDICINE

## 2019-01-21 RX ORDER — DEXTROSE AND SODIUM CHLORIDE 5; .45 G/100ML; G/100ML
30 INJECTION, SOLUTION INTRAVENOUS CONTINUOUS PRN
Status: DISCONTINUED | OUTPATIENT
Start: 2019-01-21 | End: 2019-01-21 | Stop reason: HOSPADM

## 2019-01-21 RX ORDER — LIDOCAINE HYDROCHLORIDE 10 MG/ML
INJECTION, SOLUTION INFILTRATION; PERINEURAL AS NEEDED
Status: DISCONTINUED | OUTPATIENT
Start: 2019-01-21 | End: 2019-01-21 | Stop reason: SURG

## 2019-01-21 RX ORDER — PROPOFOL 10 MG/ML
VIAL (ML) INTRAVENOUS AS NEEDED
Status: DISCONTINUED | OUTPATIENT
Start: 2019-01-21 | End: 2019-01-21 | Stop reason: SURG

## 2019-01-21 RX ADMIN — PROPOFOL 20 MG: 10 INJECTION, EMULSION INTRAVENOUS at 12:25

## 2019-01-21 RX ADMIN — LIDOCAINE HYDROCHLORIDE 100 MG: 10 INJECTION, SOLUTION INFILTRATION; PERINEURAL at 12:21

## 2019-01-21 RX ADMIN — PROPOFOL 30 MG: 10 INJECTION, EMULSION INTRAVENOUS at 12:29

## 2019-01-21 RX ADMIN — PROPOFOL 10 MG: 10 INJECTION, EMULSION INTRAVENOUS at 12:32

## 2019-01-21 RX ADMIN — DEXTROSE AND SODIUM CHLORIDE 30 ML/HR: 5; 450 INJECTION, SOLUTION INTRAVENOUS at 12:08

## 2019-01-21 RX ADMIN — PROPOFOL 100 MG: 10 INJECTION, EMULSION INTRAVENOUS at 12:21

## 2019-01-21 RX ADMIN — PROPOFOL 20 MG: 10 INJECTION, EMULSION INTRAVENOUS at 12:26

## 2019-01-21 RX ADMIN — PROPOFOL 20 MG: 10 INJECTION, EMULSION INTRAVENOUS at 12:27

## 2019-01-21 RX ADMIN — PROPOFOL 30 MG: 10 INJECTION, EMULSION INTRAVENOUS at 12:31

## 2019-01-21 RX ADMIN — PROPOFOL 30 MG: 10 INJECTION, EMULSION INTRAVENOUS at 12:33

## 2019-01-21 RX ADMIN — PROPOFOL 20 MG: 10 INJECTION, EMULSION INTRAVENOUS at 12:24

## 2019-01-21 RX ADMIN — PROPOFOL 20 MG: 10 INJECTION, EMULSION INTRAVENOUS at 12:23

## 2019-01-21 NOTE — ANESTHESIA POSTPROCEDURE EVALUATION
Patient: Ashley John    Procedure Summary     Date:  01/21/19 Room / Location:  [facility] ENDOSCOPY 3 / [facility] ENDOSCOPY    Anesthesia Start:  1217 Anesthesia Stop:  1243    Procedures:       ESOPHAGOGASTRODUODENOSCOPY--bx (N/A )      COLONOSCOPY--bx (N/A ) Diagnosis:       Pain of upper abdomen      Gastroesophageal reflux disease, esophagitis presence not specified      Diarrhea, unspecified type      Vitamin D deficiency      Change in bowel habits      Early satiety      (Pain of upper abdomen [R10.10])      (Gastroesophageal reflux disease, esophagitis presence not specified [K21.9])      (Diarrhea, unspecified type [R19.7])      (Vitamin D deficiency [E55.9])      (Change in bowel habits [R19.4])      (Early satiety [R68.81])    Surgeon:  Jayme Poon MD Provider:  Kamille Telles CRNA    Anesthesia Type:  MAC ASA Status:  4          Anesthesia Type: MAC  Last vitals  BP   131/88 (01/21/19 1201)   Temp   97.4 °F (36.3 °C) (01/21/19 1201)   Pulse   82 (01/21/19 1201)   Resp   18 (01/21/19 1201)     SpO2   99 % (01/21/19 1201)     Post Anesthesia Care and Evaluation    Patient location during evaluation: bedside  Patient participation: waiting for patient participation  Level of consciousness: sleepy but conscious  Pain score: 0  Pain management: adequate  Airway patency: patent  Anesthetic complications: No anesthetic complications  PONV Status: none  Cardiovascular status: acceptable  Respiratory status: acceptable  Hydration status: acceptable

## 2019-01-21 NOTE — ANESTHESIA PREPROCEDURE EVALUATION
Anesthesia Evaluation     NPO Solid Status: > 8 hours  NPO Liquid Status: > 6 hours           Airway   Mallampati: II  TM distance: >3 FB  Neck ROM: full  No difficulty expected  Dental - normal exam     Pulmonary - normal exam   (+) asthma,   Cardiovascular - normal exam        Neuro/Psych  GI/Hepatic/Renal/Endo    (+) obesity, morbid obesity, GERD,      Musculoskeletal     Abdominal  - normal exam   Substance History      OB/GYN          Other                        Anesthesia Plan    ASA 4     MAC     intravenous induction   Anesthetic plan, all risks, benefits, and alternatives have been provided, discussed and informed consent has been obtained with: patient.

## 2019-01-21 NOTE — H&P
No chief complaint on file.      ENDO PROCEDURE ORDERED: EGD/COLON gerd, anemias, abd pain, diarrhea, early satiety    Subjective    Ashley John is a 43 y.o. female. 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 Patient has had a right upper quadrant ultrasound at [facility] on 10/03/2018 showing normal liver and gallbladder. Urinalysis on 10/025/2018 showed trace abnormalities. Otherwise her last laboratories were done by Dr. Younger on 08/11/2017, showed normal folate, vitamin D low at 16.7, B12 low normal at 305. Normal TSH, cholesterol, CMP, insulin level. CBC showed white count 10.33, hemoglobin 15.36, hematocrit 46.2, otherwise normal.    Patient states she has had pain primarily in the right upper quadrant for many years. She feels a flipping-like sensation under her ribs, if she rubs the area it does help. Now she states anything she eats causes bloating, swelling, pain. Beans seem to cause worse pain, green vegetables also cause her to have more pain. On her worst days she has right upper quadrant pain that radiates across the left upper quadrant. She has to push on her abdomen until she can belch and it does help reduce some of her fullness and bloating. She really complains of not much of an appetite but has only lost 1 pound since she saw Lucinda in 10/2018, she claims to have lost 24 pounds this year because of this. She has never had endoscopy. She does take Bentyl before meals and states it does help her pain but not the fullness. She has variable bowel movements alternating between diarrhea and formed stools. She states usually she has more diarrhea when her pain is worse. Patient does take B12 and vitamin B12 orally every day.    Patient is a pack a day smoker for 20 years,  strongly encouraged to quit. Denied alcohol, illicit substance use. She denies past surgical history, has history of asthma and pneumonia. Family history of lung cancer, diabetes, heart disease, ulcers, polycythemia, gallbladder disease, hypertension, nervous problems, allergies. Father has had ulcers, otherwise in good health. Mother and spouse in good health,  since . One brother, 2 sisters, 3 children, all in good health.    ASSESSMENT/PLAN: Patient with abdominal pain, variable bowel habits with suspected irritable bowel, certainly inflammatory bowel disease cannot be excluded, previous laboratories showed B12 and vitamin D, mildly deficient, would consider food allergy, did recommend hepatitis diagnostic panel, ABRAMS FibroSure, vitamin D level, CBC, CMP, amylase, lipase, B12, iron studies, GI distress panel, alpha-gal testing. Would consider HIDA scan but I did recommend EGD/colonoscopy. Will do biopsies to evaluate for H. pylori, will make sure she does not have a colitis, consider early diverticular disease. I encouraged her to continue dietary modification and weight loss. Will see her in followup after the above, further pending clinical course and the results of the above.     Thank you very much, Lucinda, for this consultation and for allowing us to participate in the care of your patient. Will keep you informed.        The following portions of the patient's history were reviewed and updated as appropriate:   Past Medical History:   Diagnosis Date   • Fibrocystic breast    • Morbid obesity with BMI of 50.0-59.9, adult (CMS/[facility]) 5/10/2017     History reviewed. No pertinent surgical history.  Family History   Problem Relation Age of Onset   • Breast cancer Maternal Aunt      OB History      Para Term  AB Living    3 3 3          SAB TAB Ectopic Molar Multiple Live Births                       No Known Allergies  Social History     Socioeconomic History   • Marital status:       "Spouse name: Not on file   • Number of children: Not on file   • Years of education: Not on file   • Highest education level: Not on file   Tobacco Use   • Smoking status: Current Every Day Smoker     Packs/day: 1.00     Types: Cigarettes   • Smokeless tobacco: Never Used   Substance and Sexual Activity   • Alcohol use: No   • Drug use: No   • Sexual activity: Defer       Current Facility-Administered Medications:   •  dextrose 5 % and sodium chloride 0.45 % infusion, 30 mL/hr, Intravenous, Continuous PRN, Carlos Kenney PA-C  Review of Systems  Review of Systems   Constitutional: Positive for unexpected weight change.   HENT: Negative for trouble swallowing.    Gastrointestinal: Positive for abdominal pain, diarrhea and nausea. Negative for abdominal distention, anal bleeding, blood in stool, constipation, rectal pain and vomiting.   Genitourinary: Negative for difficulty urinating.   Musculoskeletal: Negative for back pain.   Neurological: Negative for dizziness.   All other systems reviewed and are negative.         Objective    /88   Pulse 82   Temp 97.4 °F (36.3 °C)   Resp 18   Ht 157.5 cm (62.01\")   Wt 134 kg (295 lb 13.7 oz)   LMP 01/14/2019   SpO2 99%   BMI 54.10 kg/m²   Physical Exam   Constitutional: She is oriented to person, place, and time. She appears well-developed and well-nourished. No distress.   HENT:   Head: Normocephalic and atraumatic.   Eyes: EOM are normal. Pupils are equal, round, and reactive to light.   Neck: Normal range of motion.   Cardiovascular: Normal rate, regular rhythm and normal heart sounds.   Pulmonary/Chest: Effort normal and breath sounds normal.   Abdominal: Soft. Bowel sounds are normal. She exhibits no shifting dullness, no distension, no abdominal bruit, no ascites and no mass. There is no hepatosplenomegaly. There is tenderness. There is no rigidity, no rebound, no guarding and no CVA tenderness. No hernia. Hernia confirmed negative in the ventral area. "   Obese, mild diffuse   Musculoskeletal: Normal range of motion.   Neurological: She is alert and oriented to person, place, and time.   Skin: Skin is warm and dry.   Psychiatric: She has a normal mood and affect. Her behavior is normal. Judgment and thought content normal.   Nursing note and vitals reviewed.    Assessment/Plan      1. Pain of upper abdomen    2. Gastroesophageal reflux disease, esophagitis presence not specified    3. Diarrhea, unspecified type    4. Vitamin D deficiency    5. Change in bowel habits    6. Early satiety    .   Ashley was seen today for ruq abd pain.    Diagnoses and all orders for this visit:    Pain of upper abdomen  -     Case Request; Standing  -     dextrose 5 % and sodium chloride 0.45 % infusion; Infuse 30 mL/hr into a venous catheter Continuous As Needed (Start Prior to Procedure).  -     Case Request  -     Recurrent Gastrointestinal Distress  -     Alpha - Gal Panel; Future  -     Comprehensive Metabolic Panel  -     CBC Auto Differential  -     TSH  -     Iron and TIBC  -     Ferritin  -     ABRAMS Fibrosure  -     Hepatitis Panel, Acute  -     Protime-INR  -     Vitamin D 25 Hydroxy  -     Vitamin B12  -     Amylase  -     Lipase    Gastroesophageal reflux disease, esophagitis presence not specified  -     Case Request; Standing  -     dextrose 5 % and sodium chloride 0.45 % infusion; Infuse 30 mL/hr into a venous catheter Continuous As Needed (Start Prior to Procedure).  -     Case Request  -     Recurrent Gastrointestinal Distress  -     Alpha - Gal Panel; Future  -     Comprehensive Metabolic Panel  -     CBC Auto Differential  -     TSH  -     Iron and TIBC  -     Ferritin  -     ABRAMS Fibrosure  -     Hepatitis Panel, Acute  -     Protime-INR  -     Vitamin D 25 Hydroxy  -     Vitamin B12  -     Amylase  -     Lipase    Diarrhea, unspecified type  -     Case Request; Standing  -     dextrose 5 % and sodium chloride 0.45 % infusion; Infuse 30 mL/hr into a venous catheter  Continuous As Needed (Start Prior to Procedure).  -     Case Request  -     Recurrent Gastrointestinal Distress  -     Alpha - Gal Panel; Future  -     Comprehensive Metabolic Panel  -     CBC Auto Differential  -     TSH  -     Iron and TIBC  -     Ferritin  -     ABRAMS Fibrosure  -     Hepatitis Panel, Acute  -     Protime-INR  -     Vitamin D 25 Hydroxy  -     Vitamin B12  -     Amylase  -     Lipase    Vitamin D deficiency  -     Case Request; Standing  -     dextrose 5 % and sodium chloride 0.45 % infusion; Infuse 30 mL/hr into a venous catheter Continuous As Needed (Start Prior to Procedure).  -     Case Request  -     Recurrent Gastrointestinal Distress  -     Alpha - Gal Panel; Future  -     Comprehensive Metabolic Panel  -     CBC Auto Differential  -     TSH  -     Iron and TIBC  -     Ferritin  -     ABRAMS Fibrosure  -     Hepatitis Panel, Acute  -     Protime-INR  -     Vitamin D 25 Hydroxy  -     Vitamin B12  -     Amylase  -     Lipase    Change in bowel habits  -     Case Request; Standing  -     dextrose 5 % and sodium chloride 0.45 % infusion; Infuse 30 mL/hr into a venous catheter Continuous As Needed (Start Prior to Procedure).  -     Case Request  -     Recurrent Gastrointestinal Distress  -     Alpha - Gal Panel; Future  -     Comprehensive Metabolic Panel  -     CBC Auto Differential  -     TSH  -     Iron and TIBC  -     Ferritin  -     ABRAMS Fibrosure  -     Hepatitis Panel, Acute  -     Protime-INR  -     Vitamin D 25 Hydroxy  -     Vitamin B12  -     Amylase  -     Lipase    Early satiety  -     Case Request; Standing  -     dextrose 5 % and sodium chloride 0.45 % infusion; Infuse 30 mL/hr into a venous catheter Continuous As Needed (Start Prior to Procedure).  -     Case Request  -     Recurrent Gastrointestinal Distress  -     Alpha - Gal Panel; Future  -     Comprehensive Metabolic Panel  -     CBC Auto Differential  -     TSH  -     Iron and TIBC  -     Ferritin  -     ABRAMS Fibrosure  -      Hepatitis Panel, Acute  -     Protime-INR  -     Vitamin D 25 Hydroxy  -     Vitamin B12  -     Amylase  -     Lipase    Other orders  -     Follow Anesthesia Guidelines / Standing Orders; Future  -     Obtain Informed Consent; Standing  -     POC Glucose Once; Standing  -     sodium-potassium-magnesium sulfates (SUPREP BOWEL PREP KIT) 17.5-3.13-1.6 GM/177ML solution oral solution; As directed per instruction sheet for colonoscopy        Orders placed during this encounter include:  Orders Placed This Encounter   Procedures   • Obtain Informed Consent     Standing Status:   Standing     Number of Occurrences:   1     Order Specific Question:   Informed Consent Given For     Answer:   EGD/COLON   • POC Glucose Once     Prior to Procedure on ALL Diabetic Patients     Standing Status:   Standing     Number of Occurrences:   1   • Insert Peripheral IV     Standing Status:   Standing     Number of Occurrences:   1       Medications prescribed:  New Medications Ordered This Visit   Medications   • dextrose 5 % and sodium chloride 0.45 % infusion       Requested Prescriptions     Signed Prescriptions Disp Refills   • sodium-potassium-magnesium sulfates (SUPREP BOWEL PREP KIT) 17.5-3.13-1.6 GM/177ML solution oral solution 1 bottle 0     Sig: As directed per instruction sheet for colonoscopy       Review and/or summary of lab tests, radiology, procedures, medications. Review and summary of old records and obtaining of history. The risks and benefits of my recommendations, as well as other treatment options were discussed with the patient today. Questions were answered.    Follow-up: No Follow-up on file.     ESOPHAGOGASTRODUODENOSCOPY--bx (N/A), COLONOSCOPY--bx (N/A)      This document has been electronically signed by Jayme Poon MD on January 21, 2019 12:07 PM      Results for orders placed or performed in visit on 01/09/19   Alpha - Gal Panel   Result Value Ref Range    Beef <0.10 <0.35 kU/L    Class Description 0      Brian <0.10 <0.35 kU/L    Class Interpretation 0     Pork <0.10 <0.35 kU/L    Class Interpretation 0     Alpha Gal IgE 0.14 (H) <0.10 kU/L   Results for orders placed or performed in visit on 12/18/18   ABRAMS Fibrosure   Result Value Ref Range    Fibrosis Score (References) 0.11 0.00 - 0.21    Fibrosis Stage (Reference) Comment     Steatosis Score (Reference) 0.65 (H) 0.00 - 0.30    Steatosis Grade (Reference) Comment     ABRAMS Score (Reference) 0.50 (H) 0.25    Abrams Grade (Reference) Comment     Height: (Reference) 62 in    Weight: (Reference) 303 LBS    Alpha 2-Macroglobulins, Qn 252 110 - 276 mg/dL    Haptoglobin 395 (H) 34 - 200 mg/dL    Apolipoprotein A-1 141 116 - 209 mg/dL    Total Bilirubin 0.3 0.0 - 1.2 mg/dL    GGT 54 0 - 60 IU/L    ALT (SGPT) 24 0 - 40 IU/L    AST (SGOT) P5P (Reference) 21 0 - 40 IU/L    Cholesterol, Total (Reference) 213 (H) 100 - 199 mg/dL    Glucose, Serum (Reference) 88 65 - 99 mg/dL    Triglycerides 204 (H) 0 - 149 mg/dL    Interpretations: (Reference) Comment     Fibrosis Scoring: Comment     Steatosis Grading (Reference) Comment     Abrams Scoring (Reference) Comment     Limitations: (Reference) Comment     Comment (Reference) Comment    Scan Slide   Result Value Ref Range    RBC Morphology Normal Normal    WBC Morphology Normal Normal    Platelet Estimate Adequate Normal    Large Platelets Slight/1+ None Seen   Glia(IgA / G) & TTG(IgA / G)   Result Value Ref Range    Gliadin Deamidated Peptide Ab, IgA 4 0 - 19 units    Deaminated Gliadin Ab IgG 3 0 - 19 units    Tissue Transglutaminase IgA <2 0 - 3 U/mL    Tissue Transglutaminase IgG <2 0 - 5 U/mL   Allergens (12) Foods   Result Value Ref Range    Class Description Comment     Egg White <0.10 Class 0 kU/L    Milk, Cow's <0.10 Class 0 kU/L    CodFish <0.10 Class 0 kU/L    Sesame Seed <0.10 Class 0 kU/L    Peanut <0.10 Class 0 kU/L    Soybean <0.10 Class 0 kU/L    Hazelnut <0.10 Class 0 kU/L    Shrimp <0.10 Class 0 kU/L    Scallop  <0.10 Class 0 kU/L    Gluten <0.10 Class 0 kU/L    Libby <0.10 Class 0 kU/L    Wheat <0.10 Class 0 kU/L   CBC Auto Differential   Result Value Ref Range    WBC 16.36 (H) 3.20 - 9.80 10*3/mm3    RBC 5.32 (H) 3.77 - 5.16 10*6/mm3    Hemoglobin 16.8 (H) 12.0 - 15.5 g/dL    Hematocrit 46.7 (H) 35.0 - 45.0 %    MCV 87.8 80.0 - 98.0 fL    MCH 31.6 26.5 - 34.0 pg    MCHC 36.0 31.4 - 36.0 g/dL    RDW 12.5 11.5 - 14.5 %    RDW-SD 39.7 36.4 - 46.3 fl    MPV 11.7 8.0 - 12.0 fL    Platelets 239 150 - 450 10*3/mm3    Neutrophil % 66.6 37.0 - 80.0 %    Lymphocyte % 27.8 10.0 - 50.0 %    Monocyte % 3.9 0.0 - 12.0 %    Eosinophil % 0.6 0.0 - 7.0 %    Basophil % 0.2 0.0 - 2.0 %    Immature Grans % 0.9 (H) 0.0 - 0.5 %    Neutrophils, Absolute 10.90 (H) 2.00 - 8.60 10*3/mm3    Lymphocytes, Absolute 4.54 (H) 0.60 - 4.20 10*3/mm3    Monocytes, Absolute 0.63 0.00 - 0.90 10*3/mm3    Eosinophils, Absolute 0.10 0.00 - 0.70 10*3/mm3    Basophils, Absolute 0.04 0.00 - 0.20 10*3/mm3    Immature Grans, Absolute 0.15 (H) 0.00 - 0.02 10*3/mm3    nRBC 0.0 0.0 - 0.0 /100 WBC   Iron and TIBC   Result Value Ref Range    Iron 101 37 - 170 mcg/dL    TIBC 365 265 - 497 mcg/dL    Iron Saturation 28 15 - 50 %   Hepatitis Panel, Acute   Result Value Ref Range    Hepatitis C Ab Negative Negative    Hep A IgM Negative Negative    Hep B C IgM Negative Negative    Hepatitis B Surface Ag Negative Negative   Vitamin D 25 Hydroxy   Result Value Ref Range    25 Hydroxy, Vitamin D 48.1 30.0 - 100.0 ng/ml   Protime-INR   Result Value Ref Range    Protime 13.0 11.1 - 15.3 Seconds    INR 1.00 0.80 - 1.20   TSH   Result Value Ref Range    TSH 2.010 0.460 - 4.680 mIU/mL   Lipase   Result Value Ref Range    Lipase 184 23 - 300 U/L   Ferritin   Result Value Ref Range    Ferritin 99.80 6.20 - 137.00 ng/mL   Vitamin B12   Result Value Ref Range    Vitamin B-12 407 239 - 931 pg/mL   Amylase   Result Value Ref Range    Amylase 110 50 - 130 U/L   Comprehensive Metabolic Panel    Result Value Ref Range    Glucose 93 60 - 100 mg/dL    BUN 10 7 - 21 mg/dL    Creatinine 0.64 0.50 - 1.00 mg/dL    Sodium 136 (L) 137 - 145 mmol/L    Potassium 4.2 3.5 - 5.1 mmol/L    Chloride 99 95 - 110 mmol/L    CO2 23.0 22.0 - 31.0 mmol/L    Calcium 9.7 8.4 - 10.2 mg/dL    Total Protein 7.9 6.3 - 8.6 g/dL    Albumin 4.80 3.40 - 4.80 g/dL    ALT (SGPT) 29 9 - 52 U/L    AST (SGOT) 23 14 - 36 U/L    Alkaline Phosphatase 131 (H) 38 - 126 U/L    Total Bilirubin 0.4 0.2 - 1.3 mg/dL    eGFR Non  Amer 102 58 - 135 mL/min/1.73    Globulin 3.1 2.3 - 3.5 gm/dL    A/G Ratio 1.5 1.1 - 1.8 g/dL    BUN/Creatinine Ratio 15.6 7.0 - 25.0    Anion Gap 14.0 5.0 - 15.0 mmol/L     *Note: Due to a large number of results and/or encounters for the requested time period, some results have not been displayed. A complete set of results can be found in Results Review.       Some portions of this note have been dictated using voice recognition software and may contain errors and/or omissions.

## 2019-01-28 LAB
LAB AP CASE REPORT: NORMAL
LAB AP SPECIAL STAINS: NORMAL
PATH REPORT.FINAL DX SPEC: NORMAL
PATH REPORT.GROSS SPEC: NORMAL

## 2019-02-18 ENCOUNTER — OFFICE VISIT (OUTPATIENT)
Dept: GASTROENTEROLOGY | Facility: CLINIC | Age: 44
End: 2019-02-18

## 2019-02-18 VITALS
SYSTOLIC BLOOD PRESSURE: 128 MMHG | HEART RATE: 73 BPM | BODY MASS INDEX: 53.92 KG/M2 | WEIGHT: 293 LBS | DIASTOLIC BLOOD PRESSURE: 62 MMHG | HEIGHT: 62 IN

## 2019-02-18 DIAGNOSIS — R68.81 EARLY SATIETY: ICD-10-CM

## 2019-02-18 DIAGNOSIS — R11.0 NAUSEA: ICD-10-CM

## 2019-02-18 DIAGNOSIS — K58.2 IRRITABLE BOWEL SYNDROME WITH BOTH CONSTIPATION AND DIARRHEA: ICD-10-CM

## 2019-02-18 DIAGNOSIS — K21.00 GASTROESOPHAGEAL REFLUX DISEASE WITH ESOPHAGITIS: ICD-10-CM

## 2019-02-18 DIAGNOSIS — K76.0 FATTY LIVER: ICD-10-CM

## 2019-02-18 DIAGNOSIS — R10.10 PAIN OF UPPER ABDOMEN: Primary | ICD-10-CM

## 2019-02-18 PROCEDURE — 99214 OFFICE O/P EST MOD 30 MIN: CPT | Performed by: PHYSICIAN ASSISTANT

## 2019-02-18 RX ORDER — OMEPRAZOLE 40 MG/1
40 CAPSULE, DELAYED RELEASE ORAL DAILY
Qty: 30 CAPSULE | Refills: 3 | Status: SHIPPED | OUTPATIENT
Start: 2019-02-18

## 2019-02-18 NOTE — PATIENT INSTRUCTIONS

## 2019-02-18 NOTE — PROGRESS NOTES
Chief Complaint   Patient presents with   • Heartburn   • Diarrhea   • Early Satiety       ENDO PROCEDURE ORDERED:    Subjective    Ashley John is a 43 y.o. female. she is here today for follow-up.    History of Present Illness    The patient is seen on a recheck of her GERD, diarrhea, early satiety and abdominal pain.  Last seen 12/18/2018.  Patient states certain foods seem to bother her more.  She had corn dogs and chili and it seemed to cause her to hurt for 3 days in the right lower quadrant.  Hamburgers also seem to cause her more fullness and bloating.  She has had nausea and a lot of heartburn.  Bowel movements are generally fairly regular without blood or mucus.  Weight is down 1 pound since last visit.  She states initially she was given Bentyl and it was doing better, but she has been only taking it as needed.  She had prior right upper quadrant ultrasound showing normal liver and gallbladder on 10/03/2018 at [facility].      Laboratory on 01/09/2019.  CBC showed a white count of 16.36, hemoglobin 16.8, hematocrit 46.7, and 239,000 platelets.  She states her father has polycythemia and she has been told she is borderline.  Her alpha-gal was very slightly positive IgE of 0.14, otherwise negative.  Normal amylase and lipase, INR, hepatitis diagnostic panel and iron studies.  Recurrent GI distress panel was negative.  ABRAMS FibroSure 0.11/F0, steatosis 0.65/S2, 0.50/N1.  Cholesterol 213, triglycerides 204.  CMP showed sodium 136, alkaline phosphatase 131, otherwise normal.      Patient had normal TSH, vitamin D and B12 on 01/11/2019.      Patient underwent EGD/colonoscopy on 01/21/2019, which showed esophagitis, diffuse gastritis.  Distal esophageal biopsy was benign.  Antral biopsy showed reactive gastropathy.  Colonoscopy showed internal, external hemorrhoids with polyp in the sigmoid and transverse colon.  Polyp in the transverse colon was a tubular adenoma.  Random colon biopsy  was negative.  Biopsy from the sigmoid colon was a leiomyoma of the muscularis mucosa.  She was recommended to have a repeat colonoscopy in 3 years.  Patient does not have family history of colon cancer.      ASSESSMENT AND PLAN:  Patient with significant esophagitis and gastritis with chronic GERD, nausea and abdominal pain secondary to the above.  I suggested a trial on Prilosec 40 mg daily.  She was encouraged to avoid gastric irritants.  Given her persistent postprandial discomfort, I still suspect possible gallbladder.  She had had a previous negative gallbladder ultrasound, but recommend HIDA scan to further evaluate.  Polyps were noted in the colon at her young age, recommend high fiber diet.  Would plan repeat colonoscopy in 2-3 years.  Discussed her fatty liver and other laboratory studies with elevated alkaline phosphatase presumed secondary to the fatty liver.  Discussed avoiding fructose, dietary modification and weight loss.  Will consider a trial on anticholinergic, but we will see her in followup after the above.  Further pending clinical course and the results of the above.         The following portions of the patient's history were reviewed and updated as appropriate:   Past Medical History:   Diagnosis Date   • Fibrocystic breast    • Morbid obesity with BMI of 50.0-59.9, adult (CMS/[facility]) 5/10/2017     Past Surgical History:   Procedure Laterality Date   • COLONOSCOPY N/A 2019    Procedure: COLONOSCOPY--bx;  Surgeon: Jayme Poon MD;  Location: [facility] ENDOSCOPY;  Service: Gastroenterology   • ENDOSCOPY N/A 2019    Procedure: ESOPHAGOGASTRODUODENOSCOPY--bx;  Surgeon: Jayme Poon MD;  Location: [facility] ENDOSCOPY;  Service: Gastroenterology   • UPPER GASTROINTESTINAL ENDOSCOPY  2019     Family History   Problem Relation Age of Onset   • Breast cancer Maternal Aunt      OB History      Para Term  AB Living    3 3 3          SAB TAB Ectopic Molar Multiple Live  "Births                       No Known Allergies  Social History     Socioeconomic History   • Marital status:      Spouse name: Not on file   • Number of children: Not on file   • Years of education: Not on file   • Highest education level: Not on file   Tobacco Use   • Smoking status: Current Every Day Smoker     Packs/day: 1.00     Types: Cigarettes   • Smokeless tobacco: Never Used   Substance and Sexual Activity   • Alcohol use: No   • Drug use: No   • Sexual activity: Defer       Current Outpatient Medications:   •  albuterol (PROVENTIL HFA;VENTOLIN HFA) 108 (90 Base) MCG/ACT inhaler, Inhale 2 puffs Every 4 (Four) Hours As Needed for Wheezing., Disp: 18 g, Rfl: 3  •  albuterol (PROVENTIL) (2.5 MG/3ML) 0.083% nebulizer solution, Take 2.5 mg by nebulization Every 4 (Four) Hours As Needed for Wheezing., Disp: 75 mL, Rfl: 2  •  cetirizine (zyrTEC) 10 MG tablet, Take 1 tablet by mouth Daily., Disp: 30 tablet, Rfl: 11  •  vitamin B-12 (CYANOCOBALAMIN) 100 MCG tablet, Take 50 mcg by mouth Daily., Disp: , Rfl:   •  Vitamin D, Cholecalciferol, (CHOLECALCIFEROL) 400 units tablet, Take 400 Units by mouth Daily., Disp: , Rfl:   •  omeprazole (priLOSEC) 40 MG capsule, Take 1 capsule by mouth Daily., Disp: 30 capsule, Rfl: 3  Review of Systems  Review of Systems       Objective    /62 (BP Location: Left arm)   Pulse 73   Ht 157.5 cm (62\")   Wt (!) 137 kg (302 lb)   LMP 02/11/2019   BMI 55.24 kg/m²   Physical Exam   Constitutional: She is oriented to person, place, and time. She appears well-developed and well-nourished. No distress.   HENT:   Head: Normocephalic and atraumatic.   Eyes: EOM are normal. Pupils are equal, round, and reactive to light.   Neck: Normal range of motion.   Cardiovascular: Normal rate, regular rhythm and normal heart sounds.   Pulmonary/Chest: Effort normal and breath sounds normal.   Abdominal: Soft. Bowel sounds are normal. She exhibits no shifting dullness, no distension, no " abdominal bruit, no ascites and no mass. There is no hepatosplenomegaly. There is tenderness. There is no rigidity, no rebound, no guarding and no CVA tenderness. No hernia. Hernia confirmed negative in the ventral area.   Obese, mild diffuse   Musculoskeletal: Normal range of motion.   Neurological: She is alert and oriented to person, place, and time.   Skin: Skin is warm and dry.   Psychiatric: She has a normal mood and affect. Her behavior is normal. Judgment and thought content normal.   Nursing note and vitals reviewed.    Assessment/Plan      1. Pain of upper abdomen    2. Gastroesophageal reflux disease with esophagitis    3. Nausea    4. Early satiety    5. Irritable bowel syndrome with both constipation and diarrhea    6. Fatty liver    .   Ashley was seen today for heartburn, diarrhea and early satiety.    Diagnoses and all orders for this visit:    Pain of upper abdomen  -     NM HIDA Scan With Pharmacological Intervention    Gastroesophageal reflux disease with esophagitis  -     NM HIDA Scan With Pharmacological Intervention    Nausea  -     NM HIDA Scan With Pharmacological Intervention    Early satiety  -     NM HIDA Scan With Pharmacological Intervention    Irritable bowel syndrome with both constipation and diarrhea    Fatty liver    Other orders  -     omeprazole (priLOSEC) 40 MG capsule; Take 1 capsule by mouth Daily.        Orders placed during this encounter include:  Orders Placed This Encounter   Procedures   • NM HIDA Scan With Pharmacological Intervention     Order Specific Question:   Patient Pregnant     Answer:   No     Order Specific Question:   Is the patient breastfeeding?     Answer:   No       Medications prescribed:  New Medications Ordered This Visit   Medications   • omeprazole (priLOSEC) 40 MG capsule     Sig: Take 1 capsule by mouth Daily.     Dispense:  30 capsule     Refill:  3       Requested Prescriptions     Signed Prescriptions Disp Refills   • omeprazole (priLOSEC) 40 MG  capsule 30 capsule 3     Sig: Take 1 capsule by mouth Daily.       Review and/or summary of lab tests, radiology, procedures, medications. Review and summary of old records and obtaining of history. The risks and benefits of my recommendations, as well as other treatment options were discussed with the patient today. Questions were answered.    Follow-up: Return in about 6 weeks (around 4/1/2019), or if symptoms worsen or fail to improve.     * Surgery not found *      This document has been electronically signed by Carlos Kenney PA-C on February 21, 2019 5:26 PM      Results for orders placed or performed during the hospital encounter of 01/21/19   Tissue Pathology Exam   Result Value Ref Range    Case Report       Surgical Pathology Report                         Case: [id number]                                  Authorizing Provider:  Jayme Poon MD        Collected:           01/21/2019 12:25 PM          Ordering Location:     [facility]             Received:            01/21/2019 02:06 PM                                 [facility]                                                     Pathologist:           Franc Mirza MD                                                           Specimens:   1) - Gastric, Antrum                                                                                2) - Esophagus, Distal                                                                              3) - Large Intestine, Transverse Colon, POLYP                                                       4) - Large Intestine, colonic mucosa                                                                5) - Large Intestine, Sigmoid Colon, POLYP                                                 Final Diagnosis       1.  GASTRIC ANTRUM, BIOPSY:  REACTIVE GASTROPATHY.    2.  DISTAL ESOPHAGUS, BIOPSY:  BENIGN SQUAMOUS MUCOSA.    3.  TRANSVERSE COLON, BIOPSY:  TUBULAR ADENOMA.    4.  COLONIC MUCOSA, RANDOM  "BIOPSY:  NO SIGNIFICANT HISTOLOGIC ABNORMALITY.    5.  SIGMOID COLON, BIOPSY:  LEIOMYOMA, CONSISTENT WITH LEIOMYOMA OF MUSCULARIS MUCOSAE.      Gross Description       1.  The first specimen consists of 2 tan fragments measuring 0.8 x 0.5 x 0.2 cm together.  Totally submitted.    2.  The second specimen is labeled \"DE\" and consists of 2 tan fragments measuring 0.7 x 0.3 x 0.1 cm together.  Totally submitted.    3.  The third specimen is labeled \"TRANS\" and consists of 4 tan fragments measuring 0.5 x 0.5 x 0.2 cm together.  Totally submitted.    4.  The fourth specimen is labeled \"CM\" and consists of 2 tan fragments measuring 1.2 x 0.3 x 0.2 cm together.  Totally submitted.    5.  The fifth specimen consists of a tan-pink fragment measuring 0.5 x 0.5 x 0.4 cm.  Totally submitted.      Special Stains       I ordered a panel of immunostains on block 5A to assist with the differential diagnosis between gastrointestinal stromal tumor and leiomyoma.    Tumor cells are positive on immunostaining for both desmin and smooth muscle actin, and negative on immunostaining for  (c-kit).    All controls show appropriate reactivity.     Results for orders placed or performed in visit on 01/09/19   Alpha - Gal Panel   Result Value Ref Range    Beef <0.10 <0.35 kU/L    Class Description 0     Lamb <0.10 <0.35 kU/L    Class Interpretation 0     Pork <0.10 <0.35 kU/L    Class Interpretation 0     Alpha Gal IgE 0.14 (H) <0.10 kU/L   Results for orders placed or performed in visit on 12/18/18   ABRAMS Fibrosure   Result Value Ref Range    Fibrosis Score (References) 0.11 0.00 - 0.21    Fibrosis Stage (Reference) Comment     Steatosis Score (Reference) 0.65 (H) 0.00 - 0.30    Steatosis Grade (Reference) Comment     ABRAMS Score (Reference) 0.50 (H) 0.25    Abrams Grade (Reference) Comment     Height: (Reference) 62 in    Weight: (Reference) 303 LBS    Alpha 2-Macroglobulins, Qn 252 110 - 276 mg/dL    Haptoglobin 395 (H) 34 - 200 mg/dL    " Apolipoprotein A-1 141 116 - 209 mg/dL    Total Bilirubin 0.3 0.0 - 1.2 mg/dL    GGT 54 0 - 60 IU/L    ALT (SGPT) 24 0 - 40 IU/L    AST (SGOT) P5P (Reference) 21 0 - 40 IU/L    Cholesterol, Total (Reference) 213 (H) 100 - 199 mg/dL    Glucose, Serum (Reference) 88 65 - 99 mg/dL    Triglycerides 204 (H) 0 - 149 mg/dL    Interpretations: (Reference) Comment     Fibrosis Scoring: Comment     Steatosis Grading (Reference) Comment     Scanlon Scoring (Reference) Comment     Limitations: (Reference) Comment     Comment (Reference) Comment    Scan Slide   Result Value Ref Range    RBC Morphology Normal Normal    WBC Morphology Normal Normal    Platelet Estimate Adequate Normal    Large Platelets Slight/1+ None Seen   Glia(IgA / G) & TTG(IgA / G)   Result Value Ref Range    Gliadin Deamidated Peptide Ab, IgA 4 0 - 19 units    Deaminated Gliadin Ab IgG 3 0 - 19 units    Tissue Transglutaminase IgA <2 0 - 3 U/mL    Tissue Transglutaminase IgG <2 0 - 5 U/mL   Allergens (12) Foods   Result Value Ref Range    Class Description Comment     Egg White <0.10 Class 0 kU/L    Milk, Cow's <0.10 Class 0 kU/L    CodFish <0.10 Class 0 kU/L    Sesame Seed <0.10 Class 0 kU/L    Peanut <0.10 Class 0 kU/L    Soybean <0.10 Class 0 kU/L    Hazelnut <0.10 Class 0 kU/L    Shrimp <0.10 Class 0 kU/L    Scallop <0.10 Class 0 kU/L    Gluten <0.10 Class 0 kU/L    Barco <0.10 Class 0 kU/L    Wheat <0.10 Class 0 kU/L   CBC Auto Differential   Result Value Ref Range    WBC 16.36 (H) 3.20 - 9.80 10*3/mm3    RBC 5.32 (H) 3.77 - 5.16 10*6/mm3    Hemoglobin 16.8 (H) 12.0 - 15.5 g/dL    Hematocrit 46.7 (H) 35.0 - 45.0 %    MCV 87.8 80.0 - 98.0 fL    MCH 31.6 26.5 - 34.0 pg    MCHC 36.0 31.4 - 36.0 g/dL    RDW 12.5 11.5 - 14.5 %    RDW-SD 39.7 36.4 - 46.3 fl    MPV 11.7 8.0 - 12.0 fL    Platelets 239 150 - 450 10*3/mm3    Neutrophil % 66.6 37.0 - 80.0 %    Lymphocyte % 27.8 10.0 - 50.0 %    Monocyte % 3.9 0.0 - 12.0 %    Eosinophil % 0.6 0.0 - 7.0 %    Basophil  % 0.2 0.0 - 2.0 %    Immature Grans % 0.9 (H) 0.0 - 0.5 %    Neutrophils, Absolute 10.90 (H) 2.00 - 8.60 10*3/mm3    Lymphocytes, Absolute 4.54 (H) 0.60 - 4.20 10*3/mm3    Monocytes, Absolute 0.63 0.00 - 0.90 10*3/mm3    Eosinophils, Absolute 0.10 0.00 - 0.70 10*3/mm3    Basophils, Absolute 0.04 0.00 - 0.20 10*3/mm3    Immature Grans, Absolute 0.15 (H) 0.00 - 0.02 10*3/mm3    nRBC 0.0 0.0 - 0.0 /100 WBC   Iron and TIBC   Result Value Ref Range    Iron 101 37 - 170 mcg/dL    TIBC 365 265 - 497 mcg/dL    Iron Saturation 28 15 - 50 %   Hepatitis Panel, Acute   Result Value Ref Range    Hepatitis C Ab Negative Negative    Hep A IgM Negative Negative    Hep B C IgM Negative Negative    Hepatitis B Surface Ag Negative Negative   Vitamin D 25 Hydroxy   Result Value Ref Range    25 Hydroxy, Vitamin D 48.1 30.0 - 100.0 ng/ml   Protime-INR   Result Value Ref Range    Protime 13.0 11.1 - 15.3 Seconds    INR 1.00 0.80 - 1.20   TSH   Result Value Ref Range    TSH 2.010 0.460 - 4.680 mIU/mL   Lipase   Result Value Ref Range    Lipase 184 23 - 300 U/L   Ferritin   Result Value Ref Range    Ferritin 99.80 6.20 - 137.00 ng/mL   Vitamin B12   Result Value Ref Range    Vitamin B-12 407 239 - 931 pg/mL   Amylase   Result Value Ref Range    Amylase 110 50 - 130 U/L   Comprehensive Metabolic Panel   Result Value Ref Range    Glucose 93 60 - 100 mg/dL    BUN 10 7 - 21 mg/dL    Creatinine 0.64 0.50 - 1.00 mg/dL    Sodium 136 (L) 137 - 145 mmol/L    Potassium 4.2 3.5 - 5.1 mmol/L    Chloride 99 95 - 110 mmol/L    CO2 23.0 22.0 - 31.0 mmol/L    Calcium 9.7 8.4 - 10.2 mg/dL    Total Protein 7.9 6.3 - 8.6 g/dL    Albumin 4.80 3.40 - 4.80 g/dL    ALT (SGPT) 29 9 - 52 U/L    AST (SGOT) 23 14 - 36 U/L    Alkaline Phosphatase 131 (H) 38 - 126 U/L    Total Bilirubin 0.4 0.2 - 1.3 mg/dL    eGFR Non  Amer 102 58 - 135 mL/min/1.73    Globulin 3.1 2.3 - 3.5 gm/dL    A/G Ratio 1.5 1.1 - 1.8 g/dL    BUN/Creatinine Ratio 15.6 7.0 - 25.0    Anion  Gap 14.0 5.0 - 15.0 mmol/L     *Note: Due to a large number of results and/or encounters for the requested time period, some results have not been displayed. A complete set of results can be found in Results Review.       Some portions of this note have been dictated using voice recognition software and may contain errors and/or omissions.

## 2019-03-04 ENCOUNTER — TELEPHONE (OUTPATIENT)
Dept: GASTROENTEROLOGY | Facility: CLINIC | Age: 44
End: 2019-03-04

## 2019-03-04 NOTE — TELEPHONE ENCOUNTER
----- Message from Carlos Kenney PA-C sent at 3/4/2019 10:04 AM CST -----  Contact: [phone]  I never said anything about morphine. I know of no reason anyone would be given morphine for an xray. If she wants to wait on the Hida that is her choice. Just document.  ----- Message -----  From: Brooke Holt MA  Sent: 3/4/2019   9:44 AM  To: Carlos Kenney PA-C    Patient states that she has changed her diet and was feeling better and wanted to know if you thought that it would still be medically necessary for her to have the Hida Scan done? Also she states that she was told that they may have to give her Morphine during the Hida and she is freaked out about that because she has never had narcotics before.   ----- Message -----  From: Yumiko Rosario  Sent: 3/1/2019   3:10 PM  To: Brooke Holt MA    Patient would like you to call her has some question about having hida scan or not. Phone number above [phone].

## 2019-03-04 NOTE — TELEPHONE ENCOUNTER
Patients telephone call has been returned patient states since making dietary modifications she is not currently having any symptoms and requested to cancel the Hida Scan at this time. This has been cancelled at her request. Recommend she keep her follow up appointment scheduled 04/09/2019 patient voiced understanding.

## 2019-03-05 ENCOUNTER — DOCUMENTATION (OUTPATIENT)
Dept: GASTROENTEROLOGY | Facility: CLINIC | Age: 44
End: 2019-03-05

## 2019-03-19 ENCOUNTER — APPOINTMENT (OUTPATIENT)
Dept: NUCLEAR MEDICINE | Facility: HOSPITAL | Age: 44
End: 2019-03-19

## 2019-03-19 ENCOUNTER — HOSPITAL ENCOUNTER (OUTPATIENT)
Dept: NUCLEAR MEDICINE | Facility: HOSPITAL | Age: 44
Discharge: HOME | End: 2019-03-19

## 2019-03-19 PROCEDURE — 78226 HEPATOBILIARY SYSTEM IMAGING: CPT

## 2019-03-19 PROCEDURE — A9537 TC99M MEBROFENIN: HCPCS | Performed by: PHYSICIAN ASSISTANT

## 2019-03-19 PROCEDURE — 0 TECHNETIUM TC 99M MEBROFENIN KIT: Performed by: PHYSICIAN ASSISTANT

## 2019-03-19 RX ORDER — KIT FOR THE PREPARATION OF TECHNETIUM TC 99M MEBROFENIN 45 MG/10ML
1 INJECTION, POWDER, LYOPHILIZED, FOR SOLUTION INTRAVENOUS
Status: COMPLETED | OUTPATIENT
Start: 2019-03-19 | End: 2019-03-19

## 2019-03-19 RX ADMIN — MEBROFENIN 1 DOSE: 45 INJECTION, POWDER, LYOPHILIZED, FOR SOLUTION INTRAVENOUS at 07:31

## 2019-04-09 ENCOUNTER — OFFICE VISIT (OUTPATIENT)
Dept: GASTROENTEROLOGY | Facility: CLINIC | Age: 44
End: 2019-04-09

## 2019-04-09 VITALS
WEIGHT: 293 LBS | HEART RATE: 76 BPM | DIASTOLIC BLOOD PRESSURE: 68 MMHG | SYSTOLIC BLOOD PRESSURE: 128 MMHG | BODY MASS INDEX: 53.92 KG/M2 | HEIGHT: 62 IN

## 2019-04-09 DIAGNOSIS — K76.0 FATTY LIVER: ICD-10-CM

## 2019-04-09 DIAGNOSIS — R10.84 GENERALIZED ABDOMINAL PAIN: ICD-10-CM

## 2019-04-09 DIAGNOSIS — K58.2 IRRITABLE BOWEL SYNDROME WITH BOTH CONSTIPATION AND DIARRHEA: Primary | ICD-10-CM

## 2019-04-09 DIAGNOSIS — K21.9 GASTROESOPHAGEAL REFLUX DISEASE, ESOPHAGITIS PRESENCE NOT SPECIFIED: ICD-10-CM

## 2019-04-09 PROCEDURE — 99214 OFFICE O/P EST MOD 30 MIN: CPT | Performed by: PHYSICIAN ASSISTANT

## 2019-04-09 RX ORDER — HYOSCYAMINE SULFATE EXTENDED-RELEASE 0.38 MG/1
0.38 TABLET ORAL EVERY 12 HOURS PRN
Qty: 60 TABLET | Refills: 3 | Status: SHIPPED | OUTPATIENT
Start: 2019-04-09 | End: 2019-04-10 | Stop reason: CLARIF

## 2019-04-09 RX ORDER — DICYCLOMINE HCL 20 MG
20 TABLET ORAL
COMMUNITY
End: 2019-04-09

## 2019-04-09 NOTE — PATIENT INSTRUCTIONS

## 2019-04-09 NOTE — PROGRESS NOTES
OP Anticoagulation Telephone Note    Date: 10/18/2017  Anticoagulation Summary  As of 10/18/2017    INR goal:   2.5-3.0   TTR:   65.5 % (2.4 y)   Today's INR:   2.9   Maintenance plan:   1.5 mg (3 mg x 0.5) on Fri; 3 mg (3 mg x 1) all other days   Weekly total:   19.5 mg   No change documented:   Arpita Wesley, Med Ass't   Plan last modified:   Kristopher Escobar, PharmD (7/3/2017)   Next INR check:   11/1/2017   Priority:   Maintenance   Target end date:   Indefinite    Indications    Atrial fibrillation (CMS-HCC) [I48.91]  Long term current use of anticoagulant therapy [Z79.01]             Anticoagulation Episode Summary     INR check location:   Home Draw    Preferred lab:       Send INR reminders to:       Comments:   Waqar MCLAUGHLIN  goal range changed 11- per cards      Anticoagulation Care Providers     Provider Role Specialty Phone number    Hu Gamez M.D. Referring Cardiac Electrophysiology [phone]    [facility] Responsible  [phone]    Kristopher Escobar, PharmD Responsible          Anticoagulation Patient Findings  Patient Findings     Negatives:   Signs/symptoms of thrombosis, Signs/symptoms of bleeding, Laboratory test error suspected, Change in health, Change in alcohol use, Change in activity, Upcoming invasive procedure, Emergency department visit, Upcoming dental procedure, Missed doses, Extra doses, Change in medications, Change in diet/appetite, Hospital admission, Bruising, Other complaints      Plan:  Spoke with patient on the phone. Patient is therapeutic today. Patient denies any changes in medications or diet. Patient denies any signs or symptoms of bleeding or clotting. Instructed patient to call clinic if any unusual bleeding or bruising occurs. Will continue dosing as outlined above. Will follow-up with patient in 2 weeks.    Arpita Wesley, Medical Assistant       Chief Complaint   Patient presents with   • Abdominal Pain   • Heartburn   • Nausea   • Fatty Liver       ENDO PROCEDURE ORDERED:    Subjective    Ashley John is a 43 y.o. female. she is here today for follow-up.    History of Present Illness    The patient is seen on a recheck of her GERD, abdominal pain, nausea, fatty liver, F0/S2/N1. Last seen 02/18/2019. The patient did undergo a HIDA scan on 03/19/2019. It showed normal liver. EF of 63% with no symptoms. The patient states on the way home she felt sick, had an urgent watery stool before she could get home from [place]. She had had a prior EGD/colonoscopy on 01/21/2019. It showed esophagitis, gastritis, hemorrhoids, tubular adenoma.    The patient notes fatty foods seem to bother her. She has been taking a 20 mg Bentyl before lunch and at supper. She still has some urgent bowel movements. She has tried to restrict her diet and has lost 6 pounds since last visit. She has been avoiding beef as that seems to cause her to have more discomfort. GERD is doing fairly well on Prilosec 40 mg daily. She denied nausea or vomiting.    ASSESSMENT/PLAN: Patient with chronic GERD, appears to be doing well on the Prilosec. Probable IBS with some functional overlay. Encouraged continued dietary modification and significant weight loss. We will consider a trial on Colestid for the diarrhea. She has had some response with the Bentyl and I suggested discontinuing that and increasing her to Levbid 0.375 mg b.i.d. I would consider a trial on Pamelor or other medications depending on the results of the above. Gallbladder studies are considered normal. We will plan follow up in 4-5 weeks, further pending clinical course and the results of the above.       The following portions of the patient's history were reviewed and updated as appropriate:   Past Medical History:   Diagnosis Date   • Fibrocystic breast    • Morbid obesity with BMI of 50.0-59.9, adult (CMS/[facility])  5/10/2017     Past Surgical History:   Procedure Laterality Date   • COLONOSCOPY N/A 2019    Procedure: COLONOSCOPY--bx;  Surgeon: Jayme Poon MD;  Location: [facility] ENDOSCOPY;  Service: Gastroenterology   • ENDOSCOPY N/A 2019    Procedure: ESOPHAGOGASTRODUODENOSCOPY--bx;  Surgeon: Jayme Poon MD;  Location: [facility] ENDOSCOPY;  Service: Gastroenterology   • UPPER GASTROINTESTINAL ENDOSCOPY  2019     Family History   Problem Relation Age of Onset   • Breast cancer Maternal Aunt      OB History      Para Term  AB Living    3 3 3          SAB TAB Ectopic Molar Multiple Live Births                       No Known Allergies  Social History     Socioeconomic History   • Marital status:      Spouse name: Not on file   • Number of children: Not on file   • Years of education: Not on file   • Highest education level: Not on file   Tobacco Use   • Smoking status: Current Every Day Smoker     Packs/day: 1.00     Types: Cigarettes   • Smokeless tobacco: Never Used   Substance and Sexual Activity   • Alcohol use: No   • Drug use: No   • Sexual activity: Defer       Current Outpatient Medications:   •  albuterol (PROVENTIL HFA;VENTOLIN HFA) 108 (90 Base) MCG/ACT inhaler, Inhale 2 puffs Every 4 (Four) Hours As Needed for Wheezing., Disp: 18 g, Rfl: 3  •  albuterol (PROVENTIL) (2.5 MG/3ML) 0.083% nebulizer solution, Take 2.5 mg by nebulization Every 4 (Four) Hours As Needed for Wheezing., Disp: 75 mL, Rfl: 2  •  cetirizine (zyrTEC) 10 MG tablet, Take 1 tablet by mouth Daily., Disp: 30 tablet, Rfl: 11  •  omeprazole (priLOSEC) 40 MG capsule, Take 1 capsule by mouth Daily., Disp: 30 capsule, Rfl: 3  •  vitamin B-12 (CYANOCOBALAMIN) 100 MCG tablet, Take 50 mcg by mouth Daily., Disp: , Rfl:   •  Vitamin D, Cholecalciferol, (CHOLECALCIFEROL) 400 units tablet, Take 400 Units by mouth Daily., Disp: , Rfl:   •  glycopyrrolate (ROBINUL) 1 MG tablet, Take 1 tablet by mouth 2 (Two) Times a Day.,  "Disp: 60 tablet, Rfl: 2  Review of Systems  Review of Systems       Objective    /68 (BP Location: Left arm)   Pulse 76   Ht 157.5 cm (62\")   Wt 134 kg (296 lb)   BMI 54.14 kg/m²   Physical Exam   Constitutional: She is oriented to person, place, and time. She appears well-developed and well-nourished. No distress.   HENT:   Head: Normocephalic and atraumatic.   Eyes: EOM are normal. Pupils are equal, round, and reactive to light.   Neck: Normal range of motion.   Cardiovascular: Normal rate, regular rhythm and normal heart sounds.   Pulmonary/Chest: Effort normal and breath sounds normal.   Abdominal: Soft. Bowel sounds are normal. She exhibits no shifting dullness, no distension, no abdominal bruit, no ascites and no mass. There is no hepatosplenomegaly. There is tenderness. There is no rigidity, no rebound, no guarding and no CVA tenderness. No hernia. Hernia confirmed negative in the ventral area.   Obese, mild diffuse   Musculoskeletal: Normal range of motion.   Neurological: She is alert and oriented to person, place, and time.   Skin: Skin is warm and dry.   Psychiatric: She has a normal mood and affect. Her behavior is normal. Judgment and thought content normal.   Nursing note and vitals reviewed.    Assessment/Plan      1. Irritable bowel syndrome with both constipation and diarrhea    2. Gastroesophageal reflux disease, esophagitis presence not specified    3. Fatty liver    4. Generalized abdominal pain    .   Ashley was seen today for abdominal pain, heartburn, nausea and fatty liver.    Diagnoses and all orders for this visit:    Irritable bowel syndrome with both constipation and diarrhea    Gastroesophageal reflux disease, esophagitis presence not specified    Fatty liver    Generalized abdominal pain    Other orders  -     Discontinue: hyoscyamine (LEVBID) 0.375 MG 12 hr tablet; Take 1 tablet by mouth Every 12 (Twelve) Hours As Needed for Cramping.  -     glycopyrrolate (ROBINUL) 1 MG " tablet; Take 1 tablet by mouth 2 (Two) Times a Day.        Orders placed during this encounter include:  No orders of the defined types were placed in this encounter.      Medications prescribed:  New Medications Ordered This Visit   Medications   • glycopyrrolate (ROBINUL) 1 MG tablet     Sig: Take 1 tablet by mouth 2 (Two) Times a Day.     Dispense:  60 tablet     Refill:  2       Requested Prescriptions     Signed Prescriptions Disp Refills   • glycopyrrolate (ROBINUL) 1 MG tablet 60 tablet 2     Sig: Take 1 tablet by mouth 2 (Two) Times a Day.       Review and/or summary of lab tests, radiology, procedures, medications. Review and summary of old records and obtaining of history. The risks and benefits of my recommendations, as well as other treatment options were discussed with the patient today. Questions were answered.    Follow-up: Return in about 6 weeks (around 5/21/2019), or if symptoms worsen or fail to improve.     * Surgery not found *      This document has been electronically signed by Carlos Kenney PA-C on April 10, 2019 5:46 PM      Results for orders placed or performed during the hospital encounter of 01/21/19   Tissue Pathology Exam   Result Value Ref Range    Case Report       Surgical Pathology Report                         Case: [id number]                                  Authorizing Provider:  Jayme Poon MD        Collected:           01/21/2019 12:25 PM          Ordering Location:     [facility]             Received:            01/21/2019 02:06 PM                                 [facility]                                                     Pathologist:           Franc Mirza MD                                                           Specimens:   1) - Gastric, Antrum                                                                                2) - Esophagus, Distal                                                                              3) - Large  "Intestine, Transverse Colon, POLYP                                                       4) - Large Intestine, colonic mucosa                                                                5) - Large Intestine, Sigmoid Colon, POLYP                                                 Final Diagnosis       1.  GASTRIC ANTRUM, BIOPSY:  REACTIVE GASTROPATHY.    2.  DISTAL ESOPHAGUS, BIOPSY:  BENIGN SQUAMOUS MUCOSA.    3.  TRANSVERSE COLON, BIOPSY:  TUBULAR ADENOMA.    4.  COLONIC MUCOSA, RANDOM BIOPSY:  NO SIGNIFICANT HISTOLOGIC ABNORMALITY.    5.  SIGMOID COLON, BIOPSY:  LEIOMYOMA, CONSISTENT WITH LEIOMYOMA OF MUSCULARIS MUCOSAE.      Gross Description       1.  The first specimen consists of 2 tan fragments measuring 0.8 x 0.5 x 0.2 cm together.  Totally submitted.    2.  The second specimen is labeled \"DE\" and consists of 2 tan fragments measuring 0.7 x 0.3 x 0.1 cm together.  Totally submitted.    3.  The third specimen is labeled \"TRANS\" and consists of 4 tan fragments measuring 0.5 x 0.5 x 0.2 cm together.  Totally submitted.    4.  The fourth specimen is labeled \"CM\" and consists of 2 tan fragments measuring 1.2 x 0.3 x 0.2 cm together.  Totally submitted.    5.  The fifth specimen consists of a tan-pink fragment measuring 0.5 x 0.5 x 0.4 cm.  Totally submitted.      Special Stains       I ordered a panel of immunostains on block 5A to assist with the differential diagnosis between gastrointestinal stromal tumor and leiomyoma.    Tumor cells are positive on immunostaining for both desmin and smooth muscle actin, and negative on immunostaining for  (c-kit).    All controls show appropriate reactivity.     Results for orders placed or performed in visit on 01/09/19   Alpha - Gal Panel   Result Value Ref Range    Beef <0.10 <0.35 kU/L    Class Description 0     Lamb <0.10 <0.35 kU/L    Class Interpretation 0     Pork <0.10 <0.35 kU/L    Class Interpretation 0     Alpha Gal IgE 0.14 (H) <0.10 kU/L   Results for " orders placed or performed in visit on 12/18/18   ABRAMS Fibrosure   Result Value Ref Range    Fibrosis Score (References) 0.11 0.00 - 0.21    Fibrosis Stage (Reference) Comment     Steatosis Score (Reference) 0.65 (H) 0.00 - 0.30    Steatosis Grade (Reference) Comment     ABRAMS Score (Reference) 0.50 (H) 0.25    Abrams Grade (Reference) Comment     Height: (Reference) 62 in    Weight: (Reference) 303 LBS    Alpha 2-Macroglobulins, Qn 252 110 - 276 mg/dL    Haptoglobin 395 (H) 34 - 200 mg/dL    Apolipoprotein A-1 141 116 - 209 mg/dL    Total Bilirubin 0.3 0.0 - 1.2 mg/dL    GGT 54 0 - 60 IU/L    ALT (SGPT) 24 0 - 40 IU/L    AST (SGOT) P5P (Reference) 21 0 - 40 IU/L    Cholesterol, Total (Reference) 213 (H) 100 - 199 mg/dL    Glucose, Serum (Reference) 88 65 - 99 mg/dL    Triglycerides 204 (H) 0 - 149 mg/dL    Interpretations: (Reference) Comment     Fibrosis Scoring: Comment     Steatosis Grading (Reference) Comment     Abrams Scoring (Reference) Comment     Limitations: (Reference) Comment     Comment (Reference) Comment    Scan Slide   Result Value Ref Range    RBC Morphology Normal Normal    WBC Morphology Normal Normal    Platelet Estimate Adequate Normal    Large Platelets Slight/1+ None Seen   Glia(IgA / G) & TTG(IgA / G)   Result Value Ref Range    Gliadin Deamidated Peptide Ab, IgA 4 0 - 19 units    Deaminated Gliadin Ab IgG 3 0 - 19 units    Tissue Transglutaminase IgA <2 0 - 3 U/mL    Tissue Transglutaminase IgG <2 0 - 5 U/mL   Allergens (12) Foods   Result Value Ref Range    Class Description Comment     Egg White <0.10 Class 0 kU/L    Milk, Cow's <0.10 Class 0 kU/L    CodFish <0.10 Class 0 kU/L    Sesame Seed <0.10 Class 0 kU/L    Peanut <0.10 Class 0 kU/L    Soybean <0.10 Class 0 kU/L    Hazelnut <0.10 Class 0 kU/L    Shrimp <0.10 Class 0 kU/L    Scallop <0.10 Class 0 kU/L    Gluten <0.10 Class 0 kU/L    [place] <0.10 Class 0 kU/L    Wheat <0.10 Class 0 kU/L   CBC Auto Differential   Result Value Ref Range     WBC 16.36 (H) 3.20 - 9.80 10*3/mm3    RBC 5.32 (H) 3.77 - 5.16 10*6/mm3    Hemoglobin 16.8 (H) 12.0 - 15.5 g/dL    Hematocrit 46.7 (H) 35.0 - 45.0 %    MCV 87.8 80.0 - 98.0 fL    MCH 31.6 26.5 - 34.0 pg    MCHC 36.0 31.4 - 36.0 g/dL    RDW 12.5 11.5 - 14.5 %    RDW-SD 39.7 36.4 - 46.3 fl    MPV 11.7 8.0 - 12.0 fL    Platelets 239 150 - 450 10*3/mm3    Neutrophil % 66.6 37.0 - 80.0 %    Lymphocyte % 27.8 10.0 - 50.0 %    Monocyte % 3.9 0.0 - 12.0 %    Eosinophil % 0.6 0.0 - 7.0 %    Basophil % 0.2 0.0 - 2.0 %    Immature Grans % 0.9 (H) 0.0 - 0.5 %    Neutrophils, Absolute 10.90 (H) 2.00 - 8.60 10*3/mm3    Lymphocytes, Absolute 4.54 (H) 0.60 - 4.20 10*3/mm3    Monocytes, Absolute 0.63 0.00 - 0.90 10*3/mm3    Eosinophils, Absolute 0.10 0.00 - 0.70 10*3/mm3    Basophils, Absolute 0.04 0.00 - 0.20 10*3/mm3    Immature Grans, Absolute 0.15 (H) 0.00 - 0.02 10*3/mm3    nRBC 0.0 0.0 - 0.0 /100 WBC   Iron and TIBC   Result Value Ref Range    Iron 101 37 - 170 mcg/dL    TIBC 365 265 - 497 mcg/dL    Iron Saturation 28 15 - 50 %   Hepatitis Panel, Acute   Result Value Ref Range    Hepatitis C Ab Negative Negative    Hep A IgM Negative Negative    Hep B C IgM Negative Negative    Hepatitis B Surface Ag Negative Negative   Vitamin D 25 Hydroxy   Result Value Ref Range    25 Hydroxy, Vitamin D 48.1 30.0 - 100.0 ng/ml   Protime-INR   Result Value Ref Range    Protime 13.0 11.1 - 15.3 Seconds    INR 1.00 0.80 - 1.20   TSH   Result Value Ref Range    TSH 2.010 0.460 - 4.680 mIU/mL   Lipase   Result Value Ref Range    Lipase 184 23 - 300 U/L   Ferritin   Result Value Ref Range    Ferritin 99.80 6.20 - 137.00 ng/mL   Vitamin B12   Result Value Ref Range    Vitamin B-12 407 239 - 931 pg/mL   Amylase   Result Value Ref Range    Amylase 110 50 - 130 U/L   Comprehensive Metabolic Panel   Result Value Ref Range    Glucose 93 60 - 100 mg/dL    BUN 10 7 - 21 mg/dL    Creatinine 0.64 0.50 - 1.00 mg/dL    Sodium 136 (L) 137 - 145 mmol/L     Potassium 4.2 3.5 - 5.1 mmol/L    Chloride 99 95 - 110 mmol/L    CO2 23.0 22.0 - 31.0 mmol/L    Calcium 9.7 8.4 - 10.2 mg/dL    Total Protein 7.9 6.3 - 8.6 g/dL    Albumin 4.80 3.40 - 4.80 g/dL    ALT (SGPT) 29 9 - 52 U/L    AST (SGOT) 23 14 - 36 U/L    Alkaline Phosphatase 131 (H) 38 - 126 U/L    Total Bilirubin 0.4 0.2 - 1.3 mg/dL    eGFR Non  Amer 102 58 - 135 mL/min/1.73    Globulin 3.1 2.3 - 3.5 gm/dL    A/G Ratio 1.5 1.1 - 1.8 g/dL    BUN/Creatinine Ratio 15.6 7.0 - 25.0    Anion Gap 14.0 5.0 - 15.0 mmol/L     *Note: Due to a large number of results and/or encounters for the requested time period, some results have not been displayed. A complete set of results can be found in Results Review.       Some portions of this note have been dictated using voice recognition software and may contain errors and/or omissions.

## 2019-04-10 ENCOUNTER — TELEPHONE (OUTPATIENT)
Dept: GASTROENTEROLOGY | Facility: CLINIC | Age: 44
End: 2019-04-10

## 2019-04-10 RX ORDER — GLYCOPYRROLATE 1 MG/1
1 TABLET ORAL 2 TIMES DAILY
Qty: 60 TABLET | Refills: 2 | Status: SHIPPED | OUTPATIENT
Start: 2019-04-10

## 2019-04-10 NOTE — TELEPHONE ENCOUNTER
Patient has been contacted and made aware that a new Rx for Robinul 1 mg bid has been sent to [facility]. Patient voiced understanding.

## 2019-04-10 NOTE — TELEPHONE ENCOUNTER
----- Message from Carlos Kenney PA-C sent at 4/10/2019 12:05 PM CDT -----  We can try Robinul 1mg bid, but not the one I wanted to try first.  ----- Message -----  From: Brooke Holt MA  Sent: 4/9/2019   4:11 PM  To: Carlos Kenney PA-C    Patient states that her insurance company will not cover her Levbid because it is not FDA APPROVED. She would like something else sent to the pharmacy to replace that.

## 2019-05-21 ENCOUNTER — OFFICE VISIT (OUTPATIENT)
Dept: GASTROENTEROLOGY | Facility: CLINIC | Age: 44
End: 2019-05-21

## 2019-05-21 VITALS
WEIGHT: 293 LBS | SYSTOLIC BLOOD PRESSURE: 136 MMHG | HEART RATE: 67 BPM | HEIGHT: 62 IN | DIASTOLIC BLOOD PRESSURE: 80 MMHG | BODY MASS INDEX: 53.92 KG/M2

## 2019-05-21 DIAGNOSIS — K76.0 FATTY LIVER: ICD-10-CM

## 2019-05-21 DIAGNOSIS — K58.2 IRRITABLE BOWEL SYNDROME WITH BOTH CONSTIPATION AND DIARRHEA: Primary | ICD-10-CM

## 2019-05-21 DIAGNOSIS — R10.84 GENERALIZED ABDOMINAL PAIN: ICD-10-CM

## 2019-05-21 DIAGNOSIS — K21.9 GASTROESOPHAGEAL REFLUX DISEASE, ESOPHAGITIS PRESENCE NOT SPECIFIED: ICD-10-CM

## 2019-05-21 PROCEDURE — 99213 OFFICE O/P EST LOW 20 MIN: CPT | Performed by: PHYSICIAN ASSISTANT

## 2019-05-21 NOTE — PROGRESS NOTES
Chief Complaint   Patient presents with   • Irritable Bowel Syndrome   • Heartburn   • Fatty Liver       ENDO PROCEDURE ORDERED:    Subjective    Ashley John is a 43 y.o. female. she is here today for follow-up.    History of Present Illness    The patient is seen on a recheck of her GERD, IBS, fatty liver, F0/S3/N1. Last seen 2019. I had tried to give her Levbid because of her diarrhea and abdominal pain. Insurance would not cover but they would cover Robinul which she is taking twice a day. She states she feels she is doing much better. She is not having as much urgency, no accidents, bowels are more formed. Weight is up 3 pounds since last visit, no blood or mucus. GERD is well controlled on the Prilosec 40 mg. She denied nausea or vomiting, dysphagia. Last EGD/colonoscopy 2019 showed esophagitis, gastritis, hemorrhoids, tubular adenoma in the colon.    ASSESSMENT/PLAN: Patient with chronic GERD, IBS doing well on current regimen. We will continue current medications. Again discussed the importance of dietary modification and significant weight loss. She is at least 100 pounds overweight. Recommend follow up in 3 months with LFTs prior.                The following portions of the patient's history were reviewed and updated as appropriate:   Past Medical History:   Diagnosis Date   • Fibrocystic breast    • Morbid obesity with BMI of 50.0-59.9, adult (CMS/[facility]) 5/10/2017     Past Surgical History:   Procedure Laterality Date   • COLONOSCOPY N/A 2019    Procedure: COLONOSCOPY--bx;  Surgeon: Jayme Poon MD;  Location: [facility] ENDOSCOPY;  Service: Gastroenterology   • ENDOSCOPY N/A 2019    Procedure: ESOPHAGOGASTRODUODENOSCOPY--bx;  Surgeon: Jayme Poon MD;  Location: [facility] ENDOSCOPY;  Service: Gastroenterology   • UPPER GASTROINTESTINAL ENDOSCOPY  2019     Family History   Problem Relation Age of Onset   • Breast cancer Maternal Aunt      OB History      Para Term  " AB Living    3 3 3          SAB TAB Ectopic Molar Multiple Live Births                       No Known Allergies  Social History     Socioeconomic History   • Marital status:      Spouse name: Not on file   • Number of children: Not on file   • Years of education: Not on file   • Highest education level: Not on file   Tobacco Use   • Smoking status: Current Every Day Smoker     Packs/day: 1.00     Types: Cigarettes   • Smokeless tobacco: Never Used   Substance and Sexual Activity   • Alcohol use: No   • Drug use: No   • Sexual activity: Defer     Current Medications:  Prior to Admission medications    Medication Sig Start Date End Date Taking? Authorizing Provider   albuterol (PROVENTIL HFA;VENTOLIN HFA) 108 (90 Base) MCG/ACT inhaler Inhale 2 puffs Every 4 (Four) Hours As Needed for Wheezing. 10/8/18  Yes Indu Art APRN   albuterol (PROVENTIL) (2.5 MG/3ML) 0.083% nebulizer solution Take 2.5 mg by nebulization Every 4 (Four) Hours As Needed for Wheezing. 18  Yes Lamine Caruso APRN   cetirizine (zyrTEC) 10 MG tablet Take 1 tablet by mouth Daily. 10/20/17  Yes Lamine Caruso APRN   glycopyrrolate (ROBINUL) 1 MG tablet Take 1 tablet by mouth 2 (Two) Times a Day. 4/10/19  Yes Carlos Kenney PA-C   omeprazole (priLOSEC) 40 MG capsule Take 1 capsule by mouth Daily. 19  Yes Carlos Kenney PA-C   vitamin B-12 (CYANOCOBALAMIN) 100 MCG tablet Take 50 mcg by mouth Daily.    Provider, MD Woody   Vitamin D, Cholecalciferol, (CHOLECALCIFEROL) 400 units tablet Take 400 Units by mouth Daily.    Provider, MD Woody     Review of Systems  Review of Systems       Objective    /80 (BP Location: Left arm)   Pulse 67   Ht 157.5 cm (62\")   Wt 136 kg (299 lb)   BMI 54.69 kg/m²   Physical Exam   Constitutional: She is oriented to person, place, and time. She appears well-developed and well-nourished. No distress.   HENT:   Head: Normocephalic and atraumatic.   Eyes: EOM are normal. " Pupils are equal, round, and reactive to light.   Neck: Normal range of motion.   Cardiovascular: Normal rate, regular rhythm and normal heart sounds.   Pulmonary/Chest: Effort normal and breath sounds normal.   Abdominal: Soft. Bowel sounds are normal. She exhibits no shifting dullness, no distension, no abdominal bruit, no ascites and no mass. There is no hepatosplenomegaly. There is tenderness. There is no rigidity, no rebound, no guarding and no CVA tenderness. No hernia. Hernia confirmed negative in the ventral area.   Obese, mild diffuse   Musculoskeletal: Normal range of motion.   Neurological: She is alert and oriented to person, place, and time.   Skin: Skin is warm and dry.   Psychiatric: She has a normal mood and affect. Her behavior is normal. Judgment and thought content normal.   Nursing note and vitals reviewed.    Assessment/Plan      1. Irritable bowel syndrome with both constipation and diarrhea    2. Gastroesophageal reflux disease, esophagitis presence not specified    3. Fatty liver    4. Generalized abdominal pain    .   Ashley was seen today for irritable bowel syndrome, heartburn and fatty liver.    Diagnoses and all orders for this visit:    Irritable bowel syndrome with both constipation and diarrhea  -     Comprehensive Metabolic Panel; Future    Gastroesophageal reflux disease, esophagitis presence not specified  -     Comprehensive Metabolic Panel; Future    Fatty liver  -     Comprehensive Metabolic Panel; Future    Generalized abdominal pain  -     Comprehensive Metabolic Panel; Future        Orders placed during this encounter include:  Orders Placed This Encounter   Procedures   • Comprehensive Metabolic Panel     Standing Status:   Future     Standing Expiration Date:   11/17/2019       Medications prescribed:  No orders of the defined types were placed in this encounter.      Requested Prescriptions      No prescriptions requested or ordered in this encounter       Review and/or  summary of lab tests, radiology, procedures, medications. Review and summary of old records and obtaining of history. The risks and benefits of my recommendations, as well as other treatment options were discussed with the patient today. Questions were answered.    Follow-up: Return in about 3 months (around 8/21/2019), or if symptoms worsen or fail to improve, for lab prior.     * Surgery not found *      This document has been electronically signed by Carlos Kenney PA-C on May 21, 2019 6:19 PM      Results for orders placed or performed during the hospital encounter of 01/21/19   Tissue Pathology Exam   Result Value Ref Range    Case Report       Surgical Pathology Report                         Case: [id number]                                  Authorizing Provider:  Jayme Poon MD        Collected:           01/21/2019 12:25 PM          [facility]ing [facility]             Received:            01/21/2019 02:06 PM                                 [place] [facility]                                                     Pathologist:           Franc Mirza MD                                                           Specimens:   1) - Gastric, Antrum                                                                                2) - Esophagus, Distal                                                                              3) - Large Intestine, Transverse Colon, POLYP                                                       4) - Large Intestine, colonic mucosa                                                                5) - Large Intestine, Sigmoid Colon, POLYP                                                 Final Diagnosis       1.  GASTRIC ANTRUM, BIOPSY:  REACTIVE GASTROPATHY.    2.  DISTAL ESOPHAGUS, BIOPSY:  BENIGN SQUAMOUS MUCOSA.    3.  TRANSVERSE COLON, BIOPSY:  TUBULAR ADENOMA.    4.  COLONIC MUCOSA, RANDOM BIOPSY:  NO SIGNIFICANT HISTOLOGIC ABNORMALITY.    5.  SIGMOID COLON,  "BIOPSY:  LEIOMYOMA, CONSISTENT WITH LEIOMYOMA OF MUSCULARIS MUCOSAE.      Gross Description       1.  The first specimen consists of 2 tan fragments measuring 0.8 x 0.5 x 0.2 cm together.  Totally submitted.    2.  The second specimen is labeled \"DE\" and consists of 2 tan fragments measuring 0.7 x 0.3 x 0.1 cm together.  Totally submitted.    3.  The third specimen is labeled \"TRANS\" and consists of 4 tan fragments measuring 0.5 x 0.5 x 0.2 cm together.  Totally submitted.    4.  The fourth specimen is labeled \"CM\" and consists of 2 tan fragments measuring 1.2 x 0.3 x 0.2 cm together.  Totally submitted.    5.  The fifth specimen consists of a tan-pink fragment measuring 0.5 x 0.5 x 0.4 cm.  Totally submitted.      Special Stains       I ordered a panel of immunostains on block 5A to assist with the differential diagnosis between gastrointestinal stromal tumor and leiomyoma.    Tumor cells are positive on immunostaining for both desmin and smooth muscle actin, and negative on immunostaining for  (c-kit).    All controls show appropriate reactivity.     Results for orders placed or performed in visit on 01/09/19   Alpha - Gal Panel   Result Value Ref Range    Beef <0.10 <0.35 kU/L    Class Description 0     Lamb <0.10 <0.35 kU/L    Class Interpretation 0     Pork <0.10 <0.35 kU/L    Class Interpretation 0     Alpha Gal IgE 0.14 (H) <0.10 kU/L   Results for orders placed or performed in visit on 12/18/18   ABRAMS Fibrosure   Result Value Ref Range    Fibrosis Score (References) 0.11 0.00 - 0.21    Fibrosis Stage (Reference) Comment     Steatosis Score (Reference) 0.65 (H) 0.00 - 0.30    Steatosis Grade (Reference) Comment     ABRAMS Score (Reference) 0.50 (H) 0.25    Abrams Grade (Reference) Comment     Height: (Reference) 62 in    Weight: (Reference) 303 LBS    Alpha 2-Macroglobulins, Qn 252 110 - 276 mg/dL    Haptoglobin 395 (H) 34 - 200 mg/dL    Apolipoprotein A-1 141 116 - 209 mg/dL    Total Bilirubin 0.3 0.0 - 1.2 " mg/dL    GGT 54 0 - 60 IU/L    ALT (SGPT) 24 0 - 40 IU/L    AST (SGOT) P5P (Reference) 21 0 - 40 IU/L    Cholesterol, Total (Reference) 213 (H) 100 - 199 mg/dL    Glucose, Serum (Reference) 88 65 - 99 mg/dL    Triglycerides 204 (H) 0 - 149 mg/dL    Interpretations: (Reference) Comment     Fibrosis Scoring: Comment     Steatosis Grading (Reference) Comment     Scanlon Scoring (Reference) Comment     Limitations: (Reference) Comment     Comment (Reference) Comment    Scan Slide   Result Value Ref Range    RBC Morphology Normal Normal    WBC Morphology Normal Normal    Platelet Estimate Adequate Normal    Large Platelets Slight/1+ None Seen   Glia(IgA / G) & TTG(IgA / G)   Result Value Ref Range    Gliadin Deamidated Peptide Ab, IgA 4 0 - 19 units    Deaminated Gliadin Ab IgG 3 0 - 19 units    Tissue Transglutaminase IgA <2 0 - 3 U/mL    Tissue Transglutaminase IgG <2 0 - 5 U/mL   Allergens (12) Foods   Result Value Ref Range    Class Description Comment     Egg White <0.10 Class 0 kU/L    Milk, Cow's <0.10 Class 0 kU/L    CodFish <0.10 Class 0 kU/L    Sesame Seed <0.10 Class 0 kU/L    Peanut <0.10 Class 0 kU/L    Soybean <0.10 Class 0 kU/L    Hazelnut <0.10 Class 0 kU/L    Shrimp <0.10 Class 0 kU/L    Scallop <0.10 Class 0 kU/L    Gluten <0.10 Class 0 kU/L    [place] <0.10 Class 0 kU/L    Wheat <0.10 Class 0 kU/L   CBC Auto Differential   Result Value Ref Range    WBC 16.36 (H) 3.20 - 9.80 10*3/mm3    RBC 5.32 (H) 3.77 - 5.16 10*6/mm3    Hemoglobin 16.8 (H) 12.0 - 15.5 g/dL    Hematocrit 46.7 (H) 35.0 - 45.0 %    MCV 87.8 80.0 - 98.0 fL    MCH 31.6 26.5 - 34.0 pg    MCHC 36.0 31.4 - 36.0 g/dL    RDW 12.5 11.5 - 14.5 %    RDW-SD 39.7 36.4 - 46.3 fl    MPV 11.7 8.0 - 12.0 fL    Platelets 239 150 - 450 10*3/mm3    Neutrophil % 66.6 37.0 - 80.0 %    Lymphocyte % 27.8 10.0 - 50.0 %    Monocyte % 3.9 0.0 - 12.0 %    Eosinophil % 0.6 0.0 - 7.0 %    Basophil % 0.2 0.0 - 2.0 %    Immature Grans % 0.9 (H) 0.0 - 0.5 %     Neutrophils, Absolute 10.90 (H) 2.00 - 8.60 10*3/mm3    Lymphocytes, Absolute 4.54 (H) 0.60 - 4.20 10*3/mm3    Monocytes, Absolute 0.63 0.00 - 0.90 10*3/mm3    Eosinophils, Absolute 0.10 0.00 - 0.70 10*3/mm3    Basophils, Absolute 0.04 0.00 - 0.20 10*3/mm3    Immature Grans, Absolute 0.15 (H) 0.00 - 0.02 10*3/mm3    nRBC 0.0 0.0 - 0.0 /100 WBC   Iron and TIBC   Result Value Ref Range    Iron 101 37 - 170 mcg/dL    TIBC 365 265 - 497 mcg/dL    Iron Saturation 28 15 - 50 %   Hepatitis Panel, Acute   Result Value Ref Range    Hepatitis C Ab Negative Negative    Hep A IgM Negative Negative    Hep B C IgM Negative Negative    Hepatitis B Surface Ag Negative Negative   Vitamin D 25 Hydroxy   Result Value Ref Range    25 Hydroxy, Vitamin D 48.1 30.0 - 100.0 ng/ml   Protime-INR   Result Value Ref Range    Protime 13.0 11.1 - 15.3 Seconds    INR 1.00 0.80 - 1.20   TSH   Result Value Ref Range    TSH 2.010 0.460 - 4.680 mIU/mL   Lipase   Result Value Ref Range    Lipase 184 23 - 300 U/L   Ferritin   Result Value Ref Range    Ferritin 99.80 6.20 - 137.00 ng/mL   Vitamin B12   Result Value Ref Range    Vitamin B-12 407 239 - 931 pg/mL   Amylase   Result Value Ref Range    Amylase 110 50 - 130 U/L   Comprehensive Metabolic Panel   Result Value Ref Range    Glucose 93 60 - 100 mg/dL    BUN 10 7 - 21 mg/dL    Creatinine 0.64 0.50 - 1.00 mg/dL    Sodium 136 (L) 137 - 145 mmol/L    Potassium 4.2 3.5 - 5.1 mmol/L    Chloride 99 95 - 110 mmol/L    CO2 23.0 22.0 - 31.0 mmol/L    Calcium 9.7 8.4 - 10.2 mg/dL    Total Protein 7.9 6.3 - 8.6 g/dL    Albumin 4.80 3.40 - 4.80 g/dL    ALT (SGPT) 29 9 - 52 U/L    AST (SGOT) 23 14 - 36 U/L    Alkaline Phosphatase 131 (H) 38 - 126 U/L    Total Bilirubin 0.4 0.2 - 1.3 mg/dL    eGFR Non  Amer 102 58 - 135 mL/min/1.73    Globulin 3.1 2.3 - 3.5 gm/dL    A/G Ratio 1.5 1.1 - 1.8 g/dL    BUN/Creatinine Ratio 15.6 7.0 - 25.0    Anion Gap 14.0 5.0 - 15.0 mmol/L     *Note: Due to a large number of  results and/or encounters for the requested time period, some results have not been displayed. A complete set of results can be found in Results Review.       Some portions of this note have been dictated using voice recognition software and may contain errors and/or omissions.

## 2019-05-21 NOTE — PATIENT INSTRUCTIONS

## 2019-10-03 ENCOUNTER — OFFICE VISIT (OUTPATIENT)
Dept: FAMILY MEDICINE CLINIC | Facility: CLINIC | Age: 44
End: 2019-10-03

## 2019-10-03 VITALS
BODY MASS INDEX: 53.92 KG/M2 | SYSTOLIC BLOOD PRESSURE: 130 MMHG | HEIGHT: 62 IN | HEART RATE: 80 BPM | RESPIRATION RATE: 18 BRPM | TEMPERATURE: 97.4 F | OXYGEN SATURATION: 98 % | WEIGHT: 293 LBS | DIASTOLIC BLOOD PRESSURE: 88 MMHG

## 2019-10-03 DIAGNOSIS — F32.A DEPRESSION, UNSPECIFIED DEPRESSION TYPE: Primary | ICD-10-CM

## 2019-10-03 PROCEDURE — 99213 OFFICE O/P EST LOW 20 MIN: CPT | Performed by: NURSE PRACTITIONER

## 2019-10-03 RX ORDER — BUPROPION HYDROCHLORIDE 150 MG/1
150 TABLET ORAL DAILY
Qty: 30 TABLET | Refills: 5 | Status: SHIPPED | OUTPATIENT
Start: 2019-10-03 | End: 2020-05-01

## 2019-10-04 NOTE — PROGRESS NOTES
Subjective   Ashley John is a 43 y.o. female.     Here today for claritza.  She has had problems with depression long term.  Has been on several things in the past.  She reports the situation seems to be getting worse lately.  She is very sad and has lost interest in thinks she normally enjoys.  She has gotten counseling in the past and it did not seem to help.      Depression   Visit Type: initial  Onset of symptoms: at an unknown time (has had off and on for years.  recently got worsel)  Progression since onset: gradually worsening  Patient presents with the following symptoms: anhedonia, depressed mood, excessive worry, fatigue, insomnia, irritability, malaise, nervousness/anxiety, panic and restlessness.  Patient is not experiencing: chest pain, choking sensation, compulsions, confusion, decreased concentration, dizziness, dry mouth, feelings of hopelessness, feelings of worthlessness, hypersomnia, hyperventilation, impotence, memory impairment, muscle tension, nausea, obsessions, palpitations, psychomotor agitation, psychomotor retardation, shortness of breath, suicidal ideas, suicidal planning, thoughts of death, weight gain and weight loss.  Frequency of symptoms: most days   Severity: causing significant distress   Aggravated by: work stress, specific phobias and family issues  Sleep quality: fair  Nighttime awakenings: occasional  Risk factors: no known risk factors  Patient has a history of: anxiety/panic attacks, depression and substance abuse (alcohol)  No history of: anemia, arrhythmia, asthma, bipolar disorder, CAD, CHF, chronic lung disease, fibromyalgia, hyperthyroidism, suicide attempt and mental illness  Treatment tried: SSRI and individual therapy  Compliance with treatment: variable  Improvement on treatment: moderate           The following portions of the patient's history were reviewed and updated as appropriate: allergies, current medications, past family history, past medical history,  past social history, past surgical history and problem list.    Review of Systems   Constitutional: Positive for irritability. Negative for unexpected weight gain and unexpected weight loss.   HENT: Negative.    Eyes: Negative.    Respiratory: Negative.  Negative for choking and shortness of breath.    Cardiovascular: Negative.  Negative for palpitations.   Gastrointestinal: Negative.    Endocrine: Negative.    Genitourinary: Negative.  Negative for impotence.   Musculoskeletal: Negative.    Skin: Negative.    Allergic/Immunologic: Negative.    Neurological: Negative for confusion.   Hematological: Negative.    Psychiatric/Behavioral: Positive for substance abuse (alcohol) and depressed mood. Negative for decreased concentration and suicidal ideas. The patient is nervous/anxious and has insomnia.        Objective   Physical Exam   Constitutional: She is oriented to person, place, and time. She appears well-developed and well-nourished. No distress.   HENT:   Head: Normocephalic and atraumatic.   Mouth/Throat: No oropharyngeal exudate.   Eyes: Pupils are equal, round, and reactive to light.   Neck: Normal range of motion. Neck supple. No thyromegaly present.   Cardiovascular: Normal rate, regular rhythm and normal heart sounds. Exam reveals no friction rub.   No murmur heard.  Pulmonary/Chest: Effort normal and breath sounds normal. No respiratory distress. She has no wheezes. She has no rales.   Abdominal: Soft.   Musculoskeletal: Normal range of motion.   Neurological: She is alert and oriented to person, place, and time.   Skin: Skin is warm and dry.   Psychiatric: She has a normal mood and affect. Her behavior is normal. Judgment and thought content normal.   Nursing note and vitals reviewed.        Assessment/Plan   Ashley was seen today for anxiety and depression.    Diagnoses and all orders for this visit:    Depression, unspecified depression type  -     buPROPion XL (WELLBUTRIN XL) 150 MG 24 hr tablet; Take  1 tablet by mouth Daily.    BMI 50.0-59.9, adult (CMS/HCC)  Comments:  diet and exercise info given

## 2019-10-04 NOTE — PATIENT INSTRUCTIONS
Calorie Counting for Weight Loss  Calories are units of energy. Your body needs a certain amount of calories from food to keep you going throughout the day. When you eat more calories than your body needs, your body stores the extra calories as fat. When you eat fewer calories than your body needs, your body burns fat to get the energy it needs.  Calorie counting means keeping track of how many calories you eat and drink each day. Calorie counting can be helpful if you need to lose weight. If you make sure to eat fewer calories than your body needs, you should lose weight. Ask your health care provider what a healthy weight is for you.  For calorie counting to work, you will need to eat the right number of calories in a day in order to lose a healthy amount of weight per week. A dietitian can help you determine how many calories you need in a day and will give you suggestions on how to reach your calorie goal.  · A healthy amount of weight to lose per week is usually 1-2 lb (0.5-0.9 kg). This usually means that your daily calorie intake should be reduced by 500-750 calories.  · Eating 1,200 - 1,500 calories per day can help most women lose weight.  · Eating 1,500 - 1,800 calories per day can help most men lose weight.  What is my plan?  My goal is to have __________ calories per day.  If I have this many calories per day, I should lose around __________ pounds per week.  What do I need to know about calorie counting?  In order to meet your daily calorie goal, you will need to:  · Find out how many calories are in each food you would like to eat. Try to do this before you eat.  · Decide how much of the food you plan to eat.  · Write down what you ate and how many calories it had. Doing this is called keeping a food log.  To successfully lose weight, it is important to balance calorie counting with a healthy lifestyle that includes regular activity. Aim for 150 minutes of moderate exercise (such as walking) or 75  minutes of vigorous exercise (such as running) each week.  Where do I find calorie information?    The number of calories in a food can be found on a Nutrition Facts label. If a food does not have a Nutrition Facts label, try to look up the calories online or ask your dietitian for help.  Remember that calories are listed per serving. If you choose to have more than one serving of a food, you will have to multiply the calories per serving by the amount of servings you plan to eat. For example, the label on a package of bread might say that a serving size is 1 slice and that there are 90 calories in a serving. If you eat 1 slice, you will have eaten 90 calories. If you eat 2 slices, you will have eaten 180 calories.  How do I keep a food log?  Immediately after each meal, record the following information in your food log:  · What you ate. Don't forget to include toppings, sauces, and other extras on the food.  · How much you ate. This can be measured in cups, ounces, or number of items.  · How many calories each food and drink had.  · The total number of calories in the meal.  Keep your food log near you, such as in a small notebook in your pocket, or use a mobile tommy or website. Some programs will calculate calories for you and show you how many calories you have left for the day to meet your goal.  What are some calorie counting tips?    · Use your calories on foods and drinks that will fill you up and not leave you hungry:  ? Some examples of foods that fill you up are nuts and nut butters, vegetables, lean proteins, and high-fiber foods like whole grains. High-fiber foods are foods with more than 5 g fiber per serving.  ? Drinks such as sodas, specialty coffee drinks, alcohol, and juices have a lot of calories, yet do not fill you up.  · Eat nutritious foods and avoid empty calories. Empty calories are calories you get from foods or beverages that do not have many vitamins or protein, such as candy, sweets, and  "soda. It is better to have a nutritious high-calorie food (such as an avocado) than a food with few nutrients (such as a bag of chips).  · Know how many calories are in the foods you eat most often. This will help you calculate calorie counts faster.  · Pay attention to calories in drinks. Low-calorie drinks include water and unsweetened drinks.  · Pay attention to nutrition labels for \"low fat\" or \"fat free\" foods. These foods sometimes have the same amount of calories or more calories than the full fat versions. They also often have added sugar, starch, or salt, to make up for flavor that was removed with the fat.  · Find a way of tracking calories that works for you. Get creative. Try different apps or programs if writing down calories does not work for you.  What are some portion control tips?  · Know how many calories are in a serving. This will help you know how many servings of a certain food you can have.  · Use a measuring cup to measure serving sizes. You could also try weighing out portions on a kitchen scale. With time, you will be able to estimate serving sizes for some foods.  · Take some time to put servings of different foods on your favorite plates, bowls, and cups so you know what a serving looks like.  · Try not to eat straight from a bag or box. Doing this can lead to overeating. Put the amount you would like to eat in a cup or on a plate to make sure you are eating the right portion.  · Use smaller plates, glasses, and bowls to prevent overeating.  · Try not to multitask (for example, watch TV or use your computer) while eating. If it is time to eat, sit down at a table and enjoy your food. This will help you to know when you are full. It will also help you to be aware of what you are eating and how much you are eating.  What are tips for following this plan?  Reading food labels  · Check the calorie count compared to the serving size. The serving size may be smaller than what you are used to " eating.  · Check the source of the calories. Make sure the food you are eating is high in vitamins and protein and low in saturated and trans fats.  Shopping  · Read nutrition labels while you shop. This will help you make healthy decisions before you decide to purchase your food.  · Make a grocery list and stick to it.  Cooking  · Try to cook your favorite foods in a healthier way. For example, try baking instead of frying.  · Use low-fat dairy products.  Meal planning  · Use more fruits and vegetables. Half of your plate should be fruits and vegetables.  · Include lean proteins like poultry and fish.  How do I count calories when eating out?  · Ask for smaller portion sizes.  · Consider sharing an entree and sides instead of getting your own entree.  · If you get your own entree, eat only half. Ask for a box at the beginning of your meal and put the rest of your entree in it so you are not tempted to eat it.  · If calories are listed on the menu, choose the lower calorie options.  · Choose dishes that include vegetables, fruits, whole grains, low-fat dairy products, and lean protein.  · Choose items that are boiled, broiled, grilled, or steamed. Stay away from items that are buttered, battered, fried, or served with cream sauce. Items labeled “crispy” are usually fried, unless stated otherwise.  · Choose water, low-fat milk, unsweetened iced tea, or other drinks without added sugar. If you want an alcoholic beverage, choose a lower calorie option such as a glass of wine or light beer.  · Ask for dressings, sauces, and syrups on the side. These are usually high in calories, so you should limit the amount you eat.  · If you want a salad, choose a garden salad and ask for grilled meats. Avoid extra toppings like leach, cheese, or fried items. Ask for the dressing on the side, or ask for olive oil and vinegar or lemon to use as dressing.  · Estimate how many servings of a food you are given. For example, a serving of  cooked rice is ½ cup or about the size of half a baseball. Knowing serving sizes will help you be aware of how much food you are eating at restaurants. The list below tells you how big or small some common portion sizes are based on everyday objects:  ? 1 oz--4 stacked dice.  ? 3 oz--1 deck of cards.  ? 1 tsp--1 die.  ? 1 Tbsp--½ a ping-pong ball.  ? 2 Tbsp--1 ping-pong ball.  ? ½ cup--½ baseball.  ? 1 cup--1 baseball.  Summary  · Calorie counting means keeping track of how many calories you eat and drink each day. If you eat fewer calories than your body needs, you should lose weight.  · A healthy amount of weight to lose per week is usually 1-2 lb (0.5-0.9 kg). This usually means reducing your daily calorie intake by 500-750 calories.  · The number of calories in a food can be found on a Nutrition Facts label. If a food does not have a Nutrition Facts label, try to look up the calories online or ask your dietitian for help.  · Use your calories on foods and drinks that will fill you up, and not on foods and drinks that will leave you hungry.  · Use smaller plates, glasses, and bowls to prevent overeating.  This information is not intended to replace advice given to you by your health care provider. Make sure you discuss any questions you have with your health care provider.  Document Released: 12/18/2006 Document Revised: 11/17/2017 Document Reviewed: 11/17/2017  [employer] Interactive Patient Education © 2019 [employer]      Exercising to Lose Weight  Exercise is structured, repetitive physical activity to improve fitness and health. Getting regular exercise is important for everyone. It is especially important if you are overweight. Being overweight increases your risk of heart disease, stroke, diabetes, high blood pressure, and several types of cancer. Reducing your calorie intake and exercising can help you lose weight.  Exercise is usually categorized as moderate or vigorous intensity. To lose weight, most  people need to do a certain amount of moderate-intensity or vigorous-intensity exercise each week.  Moderate-intensity exercise    Moderate-intensity exercise is any activity that gets you moving enough to burn at least three times more energy (calories) than if you were sitting.  Examples of moderate exercise include:  · Walking a mile in 15 minutes.  · Doing light yard work.  · Biking at an easy pace.  Most people should get at least 150 minutes (2 hours and 30 minutes) a week of moderate-intensity exercise to maintain their body weight.  Vigorous-intensity exercise  Vigorous-intensity exercise is any activity that gets you moving enough to burn at least six times more calories than if you were sitting. When you exercise at this intensity, you should be working hard enough that you are not able to carry on a conversation.  Examples of vigorous exercise include:  · Running.  · Playing a team sport, such as football, basketball, and soccer.  · Jumping rope.  Most people should get at least 75 minutes (1 hour and 15 minutes) a week of vigorous-intensity exercise to maintain their body weight.  How can exercise affect me?  When you exercise enough to burn more calories than you eat, you lose weight. Exercise also reduces body fat and builds muscle. The more muscle you have, the more calories you burn. Exercise also:  · Improves mood.  · Reduces stress and tension.  · Improves your overall fitness, flexibility, and endurance.  · Increases bone strength.  The amount of exercise you need to lose weight depends on:  · Your age.  · The type of exercise.  · Any health conditions you have.  · Your overall physical ability.  Talk to your health care provider about how much exercise you need and what types of activities are safe for you.  What actions can I take to lose weight?  Nutrition    · Make changes to your diet as told by your health care provider or diet and nutrition specialist (dietitian). This may  include:  ? Eating fewer calories.  ? Eating more protein.  ? Eating less unhealthy fats.  ? Eating a diet that includes fresh fruits and vegetables, whole grains, low-fat dairy products, and lean protein.  ? Avoiding foods with added fat, salt, and sugar.  · Drink plenty of water while you exercise to prevent dehydration or heat stroke.  Activity  · Choose an activity that you enjoy and set realistic goals. Your health care provider can help you make an exercise plan that works for you.  · Exercise at a moderate or vigorous intensity most days of the week.  ? The intensity of exercise may vary from person to person. You can tell how intense a workout is for you by paying attention to your breathing and heartbeat. Most people will notice their breathing and heartbeat get faster with more intense exercise.  · Do resistance training twice each week, such as:  ? Push-ups.  ? Sit-ups.  ? Lifting weights.  ? Using resistance bands.  · Getting short amounts of exercise can be just as helpful as long structured periods of exercise. If you have trouble finding time to exercise, try to include exercise in your daily routine.  ? Get up, stretch, and walk around every 30 minutes throughout the day.  ? Go for a walk during your lunch break.  ? Park your car farther away from your destination.  ? If you take public transportation, get off one stop early and walk the rest of the way.  ? Make phone calls while standing up and walking around.  ? Take the stairs instead of elevators or escalators.  · Wear comfortable clothes and shoes with good support.  · Do not exercise so much that you hurt yourself, feel dizzy, or get very short of breath.  Where to find more information  · [place] Department of Health and Human Services: www.hhs.gov  · Centers for Disease Control and Prevention (CDC): www.cdc.gov  Contact a health care provider:  · Before starting a new exercise program.  · If you have questions or concerns about your  weight.  · If you have a medical problem that keeps you from exercising.  Get help right away if you have any of the following while exercising:  · Injury.  · Dizziness.  · Difficulty breathing or shortness of breath that does not go away when you stop exercising.  · Chest pain.  · Rapid heartbeat.  Summary  · Being overweight increases your risk of heart disease, stroke, diabetes, high blood pressure, and several types of cancer.  · Losing weight happens when you burn more calories than you eat.  · Reducing the amount of calories you eat in addition to getting regular moderate or vigorous exercise each week helps you lose weight.  This information is not intended to replace advice given to you by your health care provider. Make sure you discuss any questions you have with your health care provider.  Document Released: 01/20/2012 Document Revised: 12/31/2018 Document Reviewed: 12/31/2018  [employer] Patient Education © 2019 [employer]       Transposition Flap Text: The defect edges were debeveled with a #15 scalpel blade.  Given the location of the defect and the proximity to free margins a transposition flap was deemed most appropriate.  Using a sterile surgical marker, an appropriate transposition flap was drawn incorporating the defect.    The area thus outlined was incised deep to adipose tissue with a #15 scalpel blade.  The skin margins were undermined to an appropriate distance in all directions utilizing iris scissors.

## 2019-11-04 ENCOUNTER — OFFICE VISIT (OUTPATIENT)
Dept: FAMILY MEDICINE CLINIC | Facility: CLINIC | Age: 44
End: 2019-11-04

## 2019-11-04 VITALS
SYSTOLIC BLOOD PRESSURE: 116 MMHG | HEART RATE: 85 BPM | HEIGHT: 62 IN | DIASTOLIC BLOOD PRESSURE: 80 MMHG | WEIGHT: 293 LBS | TEMPERATURE: 97.9 F | BODY MASS INDEX: 53.92 KG/M2 | OXYGEN SATURATION: 96 % | RESPIRATION RATE: 18 BRPM

## 2019-11-04 DIAGNOSIS — E66.01 MORBID OBESITY WITH BMI OF 50.0-59.9, ADULT (HCC): Chronic | ICD-10-CM

## 2019-11-04 DIAGNOSIS — F32.A DEPRESSION, UNSPECIFIED DEPRESSION TYPE: Primary | ICD-10-CM

## 2019-11-04 PROCEDURE — 99214 OFFICE O/P EST MOD 30 MIN: CPT | Performed by: NURSE PRACTITIONER

## 2019-11-06 ENCOUNTER — TELEPHONE (OUTPATIENT)
Dept: FAMILY MEDICINE CLINIC | Facility: CLINIC | Age: 44
End: 2019-11-06

## 2019-11-11 NOTE — PATIENT INSTRUCTIONS
Calorie Counting for Weight Loss  Calories are units of energy. Your body needs a certain amount of calories from food to keep you going throughout the day. When you eat more calories than your body needs, your body stores the extra calories as fat. When you eat fewer calories than your body needs, your body burns fat to get the energy it needs.  Calorie counting means keeping track of how many calories you eat and drink each day. Calorie counting can be helpful if you need to lose weight. If you make sure to eat fewer calories than your body needs, you should lose weight. Ask your health care provider what a healthy weight is for you.  For calorie counting to work, you will need to eat the right number of calories in a day in order to lose a healthy amount of weight per week. A dietitian can help you determine how many calories you need in a day and will give you suggestions on how to reach your calorie goal.  · A healthy amount of weight to lose per week is usually 1-2 lb (0.5-0.9 kg). This usually means that your daily calorie intake should be reduced by 500-750 calories.  · Eating 1,200 - 1,500 calories per day can help most women lose weight.  · Eating 1,500 - 1,800 calories per day can help most men lose weight.  What is my plan?  My goal is to have __________ calories per day.  If I have this many calories per day, I should lose around __________ pounds per week.  What do I need to know about calorie counting?  In order to meet your daily calorie goal, you will need to:  · Find out how many calories are in each food you would like to eat. Try to do this before you eat.  · Decide how much of the food you plan to eat.  · Write down what you ate and how many calories it had. Doing this is called keeping a food log.  To successfully lose weight, it is important to balance calorie counting with a healthy lifestyle that includes regular activity. Aim for 150 minutes of moderate exercise (such as walking) or 75  minutes of vigorous exercise (such as running) each week.  Where do I find calorie information?    The number of calories in a food can be found on a Nutrition Facts label. If a food does not have a Nutrition Facts label, try to look up the calories online or ask your dietitian for help.  Remember that calories are listed per serving. If you choose to have more than one serving of a food, you will have to multiply the calories per serving by the amount of servings you plan to eat. For example, the label on a package of bread might say that a serving size is 1 slice and that there are 90 calories in a serving. If you eat 1 slice, you will have eaten 90 calories. If you eat 2 slices, you will have eaten 180 calories.  How do I keep a food log?  Immediately after each meal, record the following information in your food log:  · What you ate. Don't forget to include toppings, sauces, and other extras on the food.  · How much you ate. This can be measured in cups, ounces, or number of items.  · How many calories each food and drink had.  · The total number of calories in the meal.  Keep your food log near you, such as in a small notebook in your pocket, or use a mobile tommy or website. Some programs will calculate calories for you and show you how many calories you have left for the day to meet your goal.  What are some calorie counting tips?    · Use your calories on foods and drinks that will fill you up and not leave you hungry:  ? Some examples of foods that fill you up are nuts and nut butters, vegetables, lean proteins, and high-fiber foods like whole grains. High-fiber foods are foods with more than 5 g fiber per serving.  ? Drinks such as sodas, specialty coffee drinks, alcohol, and juices have a lot of calories, yet do not fill you up.  · Eat nutritious foods and avoid empty calories. Empty calories are calories you get from foods or beverages that do not have many vitamins or protein, such as candy, sweets, and  "soda. It is better to have a nutritious high-calorie food (such as an avocado) than a food with few nutrients (such as a bag of chips).  · Know how many calories are in the foods you eat most often. This will help you calculate calorie counts faster.  · Pay attention to calories in drinks. Low-calorie drinks include water and unsweetened drinks.  · Pay attention to nutrition labels for \"low fat\" or \"fat free\" foods. These foods sometimes have the same amount of calories or more calories than the full fat versions. They also often have added sugar, starch, or salt, to make up for flavor that was removed with the fat.  · Find a way of tracking calories that works for you. Get creative. Try different apps or programs if writing down calories does not work for you.  What are some portion control tips?  · Know how many calories are in a serving. This will help you know how many servings of a certain food you can have.  · Use a measuring cup to measure serving sizes. You could also try weighing out portions on a kitchen scale. With time, you will be able to estimate serving sizes for some foods.  · Take some time to put servings of different foods on your favorite plates, bowls, and cups so you know what a serving looks like.  · Try not to eat straight from a bag or box. Doing this can lead to overeating. Put the amount you would like to eat in a cup or on a plate to make sure you are eating the right portion.  · Use smaller plates, glasses, and bowls to prevent overeating.  · Try not to multitask (for example, watch TV or use your computer) while eating. If it is time to eat, sit down at a table and enjoy your food. This will help you to know when you are full. It will also help you to be aware of what you are eating and how much you are eating.  What are tips for following this plan?  Reading food labels  · Check the calorie count compared to the serving size. The serving size may be smaller than what you are used to " "eating.  · Check the source of the calories. Make sure the food you are eating is high in vitamins and protein and low in saturated and trans fats.  Shopping  · Read nutrition labels while you shop. This will help you make healthy decisions before you decide to purchase your food.  · Make a grocery list and stick to it.  Cooking  · Try to cook your favorite foods in a healthier way. For example, try baking instead of frying.  · Use low-fat dairy products.  Meal planning  · Use more fruits and vegetables. Half of your plate should be fruits and vegetables.  · Include lean proteins like poultry and fish.  How do I count calories when eating out?  · Ask for smaller portion sizes.  · Consider sharing an entree and sides instead of getting your own entree.  · If you get your own entree, eat only half. Ask for a box at the beginning of your meal and put the rest of your entree in it so you are not tempted to eat it.  · If calories are listed on the menu, choose the lower calorie options.  · Choose dishes that include vegetables, fruits, whole grains, low-fat dairy products, and lean protein.  · Choose items that are boiled, broiled, grilled, or steamed. Stay away from items that are buttered, battered, fried, or served with cream sauce. Items labeled \"crispy\" are usually fried, unless stated otherwise.  · Choose water, low-fat milk, unsweetened iced tea, or other drinks without added sugar. If you want an alcoholic beverage, choose a lower calorie option such as a glass of wine or light beer.  · Ask for dressings, sauces, and syrups on the side. These are usually high in calories, so you should limit the amount you eat.  · If you want a salad, choose a garden salad and ask for grilled meats. Avoid extra toppings like leach, cheese, or fried items. Ask for the dressing on the side, or ask for olive oil and vinegar or lemon to use as dressing.  · Estimate how many servings of a food you are given. For example, a serving of " cooked rice is ½ cup or about the size of half a baseball. Knowing serving sizes will help you be aware of how much food you are eating at restaurants. The list below tells you how big or small some common portion sizes are based on everyday objects:  ? 1 oz--4 stacked dice.  ? 3 oz--1 deck of cards.  ? 1 tsp--1 die.  ? 1 Tbsp--½ a ping-pong ball.  ? 2 Tbsp--1 ping-pong ball.  ? ½ cup--½ baseball.  ? 1 cup--1 baseball.  Summary  · Calorie counting means keeping track of how many calories you eat and drink each day. If you eat fewer calories than your body needs, you should lose weight.  · A healthy amount of weight to lose per week is usually 1-2 lb (0.5-0.9 kg). This usually means reducing your daily calorie intake by 500-750 calories.  · The number of calories in a food can be found on a Nutrition Facts label. If a food does not have a Nutrition Facts label, try to look up the calories online or ask your dietitian for help.  · Use your calories on foods and drinks that will fill you up, and not on foods and drinks that will leave you hungry.  · Use smaller plates, glasses, and bowls to prevent overeating.  This information is not intended to replace advice given to you by your health care provider. Make sure you discuss any questions you have with your health care provider.  Document Released: 12/18/2006 Document Revised: 09/06/2019 Document Reviewed: 11/17/2017  [employer] Interactive Patient Education © 2019 [employer]      Exercising to Lose Weight  Exercise is structured, repetitive physical activity to improve fitness and health. Getting regular exercise is important for everyone. It is especially important if you are overweight. Being overweight increases your risk of heart disease, stroke, diabetes, high blood pressure, and several types of cancer. Reducing your calorie intake and exercising can help you lose weight.  Exercise is usually categorized as moderate or vigorous intensity. To lose weight, most  people need to do a certain amount of moderate-intensity or vigorous-intensity exercise each week.  Moderate-intensity exercise    Moderate-intensity exercise is any activity that gets you moving enough to burn at least three times more energy (calories) than if you were sitting.  Examples of moderate exercise include:  · Walking a mile in 15 minutes.  · Doing light yard work.  · Biking at an easy pace.  Most people should get at least 150 minutes (2 hours and 30 minutes) a week of moderate-intensity exercise to maintain their body weight.  Vigorous-intensity exercise  Vigorous-intensity exercise is any activity that gets you moving enough to burn at least six times more calories than if you were sitting. When you exercise at this intensity, you should be working hard enough that you are not able to carry on a conversation.  Examples of vigorous exercise include:  · Running.  · Playing a team sport, such as football, basketball, and soccer.  · Jumping rope.  Most people should get at least 75 minutes (1 hour and 15 minutes) a week of vigorous-intensity exercise to maintain their body weight.  How can exercise affect me?  When you exercise enough to burn more calories than you eat, you lose weight. Exercise also reduces body fat and builds muscle. The more muscle you have, the more calories you burn. Exercise also:  · Improves mood.  · Reduces stress and tension.  · Improves your overall fitness, flexibility, and endurance.  · Increases bone strength.  The amount of exercise you need to lose weight depends on:  · Your age.  · The type of exercise.  · Any health conditions you have.  · Your overall physical ability.  Talk to your health care provider about how much exercise you need and what types of activities are safe for you.  What actions can I take to lose weight?  Nutrition    · Make changes to your diet as told by your health care provider or diet and nutrition specialist (dietitian). This may  include:  ? Eating fewer calories.  ? Eating more protein.  ? Eating less unhealthy fats.  ? Eating a diet that includes fresh fruits and vegetables, whole grains, low-fat dairy products, and lean protein.  ? Avoiding foods with added fat, salt, and sugar.  · Drink plenty of water while you exercise to prevent dehydration or heat stroke.  Activity  · Choose an activity that you enjoy and set realistic goals. Your health care provider can help you make an exercise plan that works for you.  · Exercise at a moderate or vigorous intensity most days of the week.  ? The intensity of exercise may vary from person to person. You can tell how intense a workout is for you by paying attention to your breathing and heartbeat. Most people will notice their breathing and heartbeat get faster with more intense exercise.  · Do resistance training twice each week, such as:  ? Push-ups.  ? Sit-ups.  ? Lifting weights.  ? Using resistance bands.  · Getting short amounts of exercise can be just as helpful as long structured periods of exercise. If you have trouble finding time to exercise, try to include exercise in your daily routine.  ? Get up, stretch, and walk around every 30 minutes throughout the day.  ? Go for a walk during your lunch break.  ? Park your car farther away from your destination.  ? If you take public transportation, get off one stop early and walk the rest of the way.  ? Make phone calls while standing up and walking around.  ? Take the stairs instead of elevators or escalators.  · Wear comfortable clothes and shoes with good support.  · Do not exercise so much that you hurt yourself, feel dizzy, or get very short of breath.  Where to find more information  · [place] Department of Health and Human Services: www.hhs.gov  · Centers for Disease Control and Prevention (CDC): www.cdc.gov  Contact a health care provider:  · Before starting a new exercise program.  · If you have questions or concerns about your  weight.  · If you have a medical problem that keeps you from exercising.  Get help right away if you have any of the following while exercising:  · Injury.  · Dizziness.  · Difficulty breathing or shortness of breath that does not go away when you stop exercising.  · Chest pain.  · Rapid heartbeat.  Summary  · Being overweight increases your risk of heart disease, stroke, diabetes, high blood pressure, and several types of cancer.  · Losing weight happens when you burn more calories than you eat.  · Reducing the amount of calories you eat in addition to getting regular moderate or vigorous exercise each week helps you lose weight.  This information is not intended to replace advice given to you by your health care provider. Make sure you discuss any questions you have with your health care provider.  Document Released: 01/20/2012 Document Revised: 12/31/2018 Document Reviewed: 12/31/2018  [employer] Interactive Patient Education © 2019 [employer]

## 2019-11-11 NOTE — PROGRESS NOTES
Subjective   Ashley John is a 43 y.o. female.     Here today for claritza on her anxiety and depression.  She says the wellbutrin is working well.  She is also trying to eat healthier.  She wants to cont the medication.      Depression   Visit Type: follow-up  Patient presents with the following symptoms: anhedonia, depressed mood, excessive worry, irritability, nervousness/anxiety and panic.  Patient is not experiencing: chest pain, choking sensation, compulsions, confusion, decreased concentration, dizziness, dry mouth, fatigue, feelings of hopelessness, feelings of worthlessness, hypersomnia, hyperventilation, impotence, insomnia, malaise, memory impairment, muscle tension, nausea, obsessions, palpitations, psychomotor agitation, psychomotor retardation, restlessness, shortness of breath, suicidal ideas, suicidal planning, thoughts of death, weight gain and weight loss.  Frequency of symptoms: constantly   Severity: causing significant distress   Sleep quality: good  Nighttime awakenings: occasional  Compliance with medications:  %             The following portions of the patient's history were reviewed and updated as appropriate: allergies, current medications, past family history, past medical history, past social history, past surgical history and problem list.    Review of Systems   Constitutional: Positive for irritability. Negative for unexpected weight gain and unexpected weight loss.   HENT: Negative.    Eyes: Negative.    Respiratory: Negative.  Negative for choking and shortness of breath.    Cardiovascular: Negative.  Negative for palpitations.   Gastrointestinal: Negative.    Endocrine: Negative.    Genitourinary: Negative.  Negative for impotence.   Musculoskeletal: Negative.    Skin: Negative.    Allergic/Immunologic: Negative.    Neurological: Negative for confusion.   Hematological: Negative.    Psychiatric/Behavioral: Positive for depressed mood. Negative for decreased concentration  and suicidal ideas. The patient is nervous/anxious. The patient does not have insomnia.        Objective   Physical Exam   Constitutional: She is oriented to person, place, and time. She appears well-developed and well-nourished. No distress.   HENT:   Head: Normocephalic and atraumatic.   Mouth/Throat: No oropharyngeal exudate.   Eyes: Pupils are equal, round, and reactive to light.   Neck: Normal range of motion. Neck supple. No thyromegaly present.   Cardiovascular: Normal rate, regular rhythm and normal heart sounds. Exam reveals no friction rub.   No murmur heard.  Pulmonary/Chest: Effort normal and breath sounds normal. No respiratory distress. She has no wheezes. She has no rales.   Abdominal: Soft.   Musculoskeletal: Normal range of motion.   Neurological: She is alert and oriented to person, place, and time.   Skin: Skin is warm and dry.   Psychiatric: She has a normal mood and affect. Her behavior is normal. Thought content normal.   Nursing note and vitals reviewed.        Assessment/Plan   Ashley was seen today for depression.    Diagnoses and all orders for this visit:    Depression, unspecified depression type  Comments:  cont with the wellbutrin    Morbid obesity with BMI of 50.0-59.9, adult (CMS/[facility])  Comments:  diet and exercie info given

## 2019-11-27 ENCOUNTER — TELEPHONE (OUTPATIENT)
Dept: FAMILY MEDICINE CLINIC | Facility: CLINIC | Age: 44
End: 2019-11-27

## 2019-12-27 ENCOUNTER — TELEPHONE (OUTPATIENT)
Dept: FAMILY MEDICINE CLINIC | Facility: CLINIC | Age: 44
End: 2019-12-27

## 2019-12-27 RX ORDER — DICLOFENAC SODIUM 75 MG/1
75 TABLET, DELAYED RELEASE ORAL 2 TIMES DAILY
Qty: 60 TABLET | Refills: 3 | Status: SHIPPED | OUTPATIENT
Start: 2019-12-27

## 2019-12-27 NOTE — TELEPHONE ENCOUNTER
Patient calling in was previously prescribed Diclosenac Sod Dr (75 2X daily) for Joint Pain at the urgent care. Would like to get a refill.     Confirmed Pharmacy

## 2019-12-27 NOTE — TELEPHONE ENCOUNTER
Patient called back checking on her med request from earlier. Patient was wanting to  RX on her lunch, but stated she is off at 4:45pm today and is willing to come by the office to be seen by Indu in order to receive her medication. Please sent RX to [facility] if possible, if not, please contact patient and advise.

## 2020-01-24 ENCOUNTER — OFFICE VISIT (OUTPATIENT)
Dept: FAMILY MEDICINE CLINIC | Facility: CLINIC | Age: 45
End: 2020-01-24

## 2020-01-24 ENCOUNTER — APPOINTMENT (OUTPATIENT)
Dept: LAB | Facility: HOSPITAL | Age: 45
End: 2020-01-24

## 2020-01-24 VITALS
SYSTOLIC BLOOD PRESSURE: 132 MMHG | OXYGEN SATURATION: 97 % | HEART RATE: 82 BPM | DIASTOLIC BLOOD PRESSURE: 88 MMHG | TEMPERATURE: 97.7 F | HEIGHT: 62 IN | RESPIRATION RATE: 20 BRPM | WEIGHT: 291.38 LBS | BODY MASS INDEX: 53.62 KG/M2

## 2020-01-24 DIAGNOSIS — M25.50 ARTHRALGIA OF MULTIPLE JOINTS: Primary | ICD-10-CM

## 2020-01-24 DIAGNOSIS — J45.30 MILD PERSISTENT REACTIVE AIRWAY DISEASE WITH WHEEZING WITHOUT COMPLICATION: ICD-10-CM

## 2020-01-24 PROBLEM — J45.909 REACTIVE AIRWAY DISEASE WITH WHEEZING: Status: ACTIVE | Noted: 2020-01-24

## 2020-01-24 PROCEDURE — 86063 ANTISTREPTOLYSIN O SCREEN: CPT | Performed by: NURSE PRACTITIONER

## 2020-01-24 PROCEDURE — 84550 ASSAY OF BLOOD/URIC ACID: CPT | Performed by: NURSE PRACTITIONER

## 2020-01-24 PROCEDURE — 86140 C-REACTIVE PROTEIN: CPT | Performed by: NURSE PRACTITIONER

## 2020-01-24 PROCEDURE — 86200 CCP ANTIBODY: CPT | Performed by: NURSE PRACTITIONER

## 2020-01-24 PROCEDURE — 99213 OFFICE O/P EST LOW 20 MIN: CPT | Performed by: NURSE PRACTITIONER

## 2020-01-24 PROCEDURE — 86431 RHEUMATOID FACTOR QUANT: CPT | Performed by: NURSE PRACTITIONER

## 2020-01-24 PROCEDURE — 86038 ANTINUCLEAR ANTIBODIES: CPT | Performed by: NURSE PRACTITIONER

## 2020-01-24 RX ORDER — MONTELUKAST SODIUM 10 MG/1
10 TABLET ORAL NIGHTLY
Qty: 30 TABLET | Refills: 2 | Status: SHIPPED | OUTPATIENT
Start: 2020-01-24 | End: 2020-06-08 | Stop reason: SDUPTHER

## 2020-01-24 RX ORDER — PREDNISONE 10 MG/1
TABLET ORAL
Qty: 1 EACH | Refills: 0 | Status: SHIPPED | OUTPATIENT
Start: 2020-01-24

## 2020-01-24 NOTE — PROGRESS NOTES
"Subjective   Ashley John is a 44 y.o. female.     [facility] Visit    PCP: RENZO Ordaz--has appt on 2-4-2020    CC: \"swelling in knees, hands, wrists and ankles, painful\"    Reports swelling and redness in joints at time, especially the right hand.  Reports history of RA and Lupus in family.     Joint Swelling   This is a chronic (bilateral knees, ankles, wrists and hands) problem. The current episode started more than 1 year ago. The problem occurs intermittently. The problem has been waxing and waning. Associated symptoms include arthralgias, coughing and joint swelling. Pertinent negatives include no abdominal pain, anorexia, change in bowel habit, chest pain, chills, congestion, diaphoresis, fatigue, fever, headaches, myalgias, nausea, neck pain, numbness, rash, sore throat, swollen glands, urinary symptoms, vertigo, visual change, vomiting or weakness. The symptoms are aggravated by walking. Treatments tried: seen at a walk in clinic in the last 6 months and has had xrays of knee and wrist which were negative.  Was given steroid and Diclofenac which seemed to help, but not pain is returning and worsening.   Wheezing    This is a recurrent problem. The current episode started more than 1 month ago. The problem occurs intermittently. The problem has been waxing and waning. Associated symptoms include coughing and shortness of breath. Pertinent negatives include no abdominal pain, chest pain, chills, coryza, diarrhea, ear pain, fever, headaches, hemoptysis, neck pain, rash, sore throat, sputum production, swollen glands or vomiting. The symptoms are aggravated by weather changes (treated for chest infection in December with steroid injection and Zithromax, but still having wheezing/tightness). She has tried beta agonist inhalers for the symptoms. The treatment provided mild relief. frequent bronchitis        The following portions of the patient's history were reviewed and updated as appropriate: " "allergies, current medications, past medical history, past social history, past surgical history and problem list.    Review of Systems   Constitutional: Negative for appetite change, chills, diaphoresis, fatigue, fever and unexpected weight loss ( has lost 30 pounds in the last year with exercise/diet).   HENT: Negative for congestion, ear discharge, ear pain, sinus pressure, sore throat and swollen glands.    Eyes: Negative.    Respiratory: Positive for cough, chest tightness, shortness of breath and wheezing. Negative for hemoptysis and sputum production.    Cardiovascular: Negative for chest pain, palpitations and leg swelling.   Gastrointestinal: Negative for abdominal pain, anorexia, change in bowel habit, diarrhea, nausea and vomiting.   Genitourinary: Negative for difficulty urinating.   Musculoskeletal: Positive for arthralgias and joint swelling. Negative for myalgias and neck pain.   Skin: Negative for rash.   Neurological: Negative for vertigo, weakness, numbness and headache.     /88 (BP Location: Other (Comment), Patient Position: Sitting, Cuff Size: Adult) Comment (BP Location): right wrist  Pulse 82   Temp 97.7 °F (36.5 °C) (Oral)   Resp 20   Ht 157.5 cm (62\")   Wt 132 kg (291 lb 6 oz)   LMP 12/27/2019   SpO2 97%   Breastfeeding No   BMI 53.29 kg/m²     Objective   Physical Exam   Constitutional: She is oriented to person, place, and time. She appears well-developed and well-nourished. No distress.   Cardiovascular: Normal rate and regular rhythm.   Pulmonary/Chest: Effort normal. She has wheezes ( inspiratory/expiratory). She has no rales.   Declines neb treatment in office     Musculoskeletal:        Right wrist: She exhibits tenderness. She exhibits normal range of motion.        Left wrist: She exhibits tenderness. She exhibits normal range of motion.        Right knee: She exhibits decreased range of motion. Tenderness found.        Left knee: She exhibits decreased range of " motion. Tenderness found.        Right ankle: She exhibits decreased range of motion. Tenderness.        Left ankle: She exhibits decreased range of motion. Tenderness.        Right hand: She exhibits tenderness and swelling. She exhibits normal range of motion.        Hands:  Neurological: She is alert and oriented to person, place, and time.   Skin: Skin is warm and dry. No rash noted. No erythema.   Nursing note and vitals reviewed.    No results found for this or any previous visit (from the past 24 hour(s)).  No Images in the past 120 days found..      Assessment/Plan   Ashley was seen today for joint swelling.    Diagnoses and all orders for this visit:    Arthralgia of multiple joints  -     predniSONE (DELTASONE) 10 MG (48) tablet pack; As directed  -     Cyclic Citrul Peptide Antibody, IgG / IgA  -     Uric acid  -     Antistreptolysin O screen  -     Rheumatoid factor  -     C-reactive protein  -     HILARY    Mild persistent reactive airway disease with wheezing without complication  -     montelukast (SINGULAIR) 10 MG tablet; Take 1 tablet by mouth Every Night.  -     predniSONE (DELTASONE) 10 MG (48) tablet pack; As directed      Start Singulair daily--Rx given  Continue with Zyrtec  Continue with Albuterol as needed  Rx for Prednisone kelli to help with reactive airway, but also arthralgias  Lab work up for arthralgias as above  May continue with Diclofenac for now    Patient's Body mass index is 53.29 kg/m². BMI is above normal parameters. Recommendations include: referral to primary care. Continue with diet/exersise.     See PCP or RTC if symptoms persist/worsen  See PCP for routine f/u visit and management of chronic medical conditions      This document has been electronically signed by RENZO Gilman on January 24, 2020 8:53 AM,.

## 2020-01-24 NOTE — PATIENT INSTRUCTIONS
Joint Pain    Joint pain can be caused by many things. It is likely to go away if you follow instructions from your doctor for taking care of yourself at home. Sometimes, you may need more treatment.  Follow these instructions at home:  Managing pain, stiffness, and swelling    · If told, put ice on the painful area.  ? Put ice in a plastic bag.  ? Place a towel between your skin and the bag.  ? Leave the ice on for 20 minutes, 2-3 times a day.  · If told, put heat on the painful area. Do this as often as told by your doctor. Use the heat source that your doctor recommends, such as a moist heat pack or a heating pad.  ? Place a towel between your skin and the heat source.  ? Leave the heat on for 20-30 minutes.  ? Take off the heat if your skin gets bright red. This is especially important if you are unable to feel pain, heat, or cold. You may have a greater risk of getting burned.  · Move your fingers or toes below the painful joint often. This helps with stiffness and swelling.  · If possible, raise (elevate) the painful joint above the level of your heart while you are sitting or lying down. To do this, try putting a few pillows under the painful joint.  Activity  · Rest the painful joint for as long as told. Do not do things that cause pain or make your pain worse.  · Begin exercising or stretching the affected area, as told by your doctor. Ask your doctor what types of exercise are safe for you.  If you have an elastic bandage, sling, or splint:  · Wear the device as told by your doctor. Take it off only as told by your doctor.  · Loosen the device if your fingers or toes below the joint:  ? Tingle.  ? Lose feeling (get numb).  ? Get cold and blue.  · Keep the device clean.  · Ask your doctor if you should take off the device before bathing. You may need to cover it with a watertight covering when you take a bath or a shower.  General instructions  · Take over-the-counter and prescription medicines only as told  by your doctor.  · Do not use any products that contain nicotine or tobacco. These include cigarettes and e-cigarettes. If you need help quitting, ask your doctor.  · Keep all follow-up visits as told by your doctor. This is important.  Contact a doctor if:  · You have pain that gets worse and does not get better with medicine.  · Your joint pain does not get better in 3 days.  · You have more bruising or swelling.  · You have a fever.  · You lose 10 lb (4.5 kg) or more without trying.  Get help right away if:  · You cannot move the joint.  · Your fingers or toes tingle, lose feeling, or get cold and blue.  · You have a fever along with a joint that is red, warm, and swollen.  Summary  · Joint pain can be caused by many things. It often goes away if you follow instructions from your doctor for taking care of yourself at home.  · Rest the painful joint for as long as told. Do not do things that cause pain or make your pain worse.  · Take over-the-counter and prescription medicines only as told by your doctor.  This information is not intended to replace advice given to you by your health care provider. Make sure you discuss any questions you have with your health care provider.  Document Released: 12/06/2010 Document Revised: 10/03/2018 Document Reviewed: 10/03/2018  [employer] Interactive Patient Education © 2019 [employer]

## 2020-01-25 LAB
ANA SER QL: NEGATIVE
ASO AB SERPL-ACNC: NEGATIVE [IU]/ML
CHROMATIN AB SERPL-ACNC: 79.4 IU/ML (ref 0–14)
CRP SERPL-MCNC: 1.18 MG/DL (ref 0–0.5)
URATE SERPL-MCNC: 4.5 MG/DL (ref 2.4–5.7)

## 2020-01-27 ENCOUNTER — TELEPHONE (OUTPATIENT)
Dept: FAMILY MEDICINE CLINIC | Facility: CLINIC | Age: 45
End: 2020-01-27

## 2020-01-27 DIAGNOSIS — M05.80 POLYARTHRITIS WITH POSITIVE RHEUMATOID FACTOR (HCC): Primary | ICD-10-CM

## 2020-01-27 LAB — CCP IGA+IGG SERPL IA-ACNC: >250 UNITS (ref 0–19)

## 2020-01-27 NOTE — TELEPHONE ENCOUNTER
PATIENT CALLING TO ADVISE NAME OF RHEUMATOLOGIST NITIN PATINO IN [place]. [address] [place] [address] DRIVE [phone]

## 2020-01-28 NOTE — TELEPHONE ENCOUNTER
I talked to this office and I have faxed over all referral and supporting documents. Facility will contact pt with appt.

## 2020-02-04 ENCOUNTER — OFFICE VISIT (OUTPATIENT)
Dept: FAMILY MEDICINE CLINIC | Facility: CLINIC | Age: 45
End: 2020-02-04

## 2020-02-04 VITALS
TEMPERATURE: 97.9 F | OXYGEN SATURATION: 93 % | HEART RATE: 84 BPM | BODY MASS INDEX: 53.92 KG/M2 | SYSTOLIC BLOOD PRESSURE: 125 MMHG | WEIGHT: 293 LBS | HEIGHT: 62 IN | DIASTOLIC BLOOD PRESSURE: 56 MMHG | RESPIRATION RATE: 20 BRPM

## 2020-02-04 DIAGNOSIS — E66.01 MORBID OBESITY WITH BMI OF 50.0-59.9, ADULT (HCC): Chronic | ICD-10-CM

## 2020-02-04 DIAGNOSIS — F33.9 EPISODE OF RECURRENT MAJOR DEPRESSIVE DISORDER, UNSPECIFIED DEPRESSION EPISODE SEVERITY (HCC): Primary | ICD-10-CM

## 2020-02-04 PROCEDURE — 99214 OFFICE O/P EST MOD 30 MIN: CPT | Performed by: NURSE PRACTITIONER

## 2020-02-06 ENCOUNTER — TELEPHONE (OUTPATIENT)
Dept: FAMILY MEDICINE CLINIC | Facility: CLINIC | Age: 45
End: 2020-02-06

## 2020-02-14 NOTE — PROGRESS NOTES
Subjective   Ashley John is a 44 y.o. female.     Here today for claritza.  She is doing well on her meds for her depression.    Depression   Visit Type: follow-up  Patient presents with the following symptoms: anhedonia, decreased concentration, depressed mood, excessive worry, irritability and nervousness/anxiety.  Patient is not experiencing: chest pain, choking sensation, compulsions, confusion, dizziness, dry mouth, fatigue, feelings of hopelessness, feelings of worthlessness, hypersomnia, hyperventilation, impotence, insomnia, malaise, memory impairment, muscle tension, nausea, obsessions, palpitations, panic, psychomotor agitation, psychomotor retardation, restlessness, shortness of breath, suicidal ideas, suicidal planning, thoughts of death, weight gain and weight loss.  Frequency of symptoms: occasionally   Severity: mild   Sleep quality: good  Nighttime awakenings: occasional  Compliance with medications:  %             The following portions of the patient's history were reviewed and updated as appropriate: allergies, current medications, past family history, past medical history, past social history, past surgical history and problem list.    Review of Systems   Constitutional: Positive for irritability. Negative for unexpected weight gain and unexpected weight loss.   HENT: Negative.    Eyes: Negative.    Respiratory: Negative.  Negative for choking and shortness of breath.    Cardiovascular: Negative.  Negative for palpitations.   Gastrointestinal: Negative.    Endocrine: Negative.    Genitourinary: Negative.  Negative for impotence.   Musculoskeletal: Negative.    Skin: Negative.    Allergic/Immunologic: Negative.    Neurological: Negative for confusion.   Hematological: Negative.    Psychiatric/Behavioral: Positive for decreased concentration and depressed mood. Negative for suicidal ideas. The patient is nervous/anxious. The patient does not have insomnia.        Objective   Physical Exam    Constitutional: She is oriented to person, place, and time. She appears well-developed and well-nourished. No distress.   HENT:   Head: Normocephalic and atraumatic.   Mouth/Throat: No oropharyngeal exudate.   Eyes: Pupils are equal, round, and reactive to light.   Neck: Normal range of motion. Neck supple. No thyromegaly present.   Cardiovascular: Normal rate, regular rhythm and normal heart sounds. Exam reveals no friction rub.   No murmur heard.  Pulmonary/Chest: Effort normal and breath sounds normal. No respiratory distress. She has no wheezes. She has no rales.   Abdominal: Soft.   Musculoskeletal: Normal range of motion.   Neurological: She is alert and oriented to person, place, and time.   Skin: Skin is warm and dry.   Psychiatric: She has a normal mood and affect. Her behavior is normal. Thought content normal.   Nursing note and vitals reviewed.        Assessment/Plan   Ashley was seen today for depression, heartburn and allergies.    Diagnoses and all orders for this visit:    Episode of recurrent major depressive disorder, unspecified depression episode severity (CMS/[facility])  Comments:  cont with wellbutrin    Morbid obesity with BMI of 50.0-59.9, adult (CMS/[facility])  Comments:  diet and exercise info given               Answers for HPI/ROS submitted by the patient on 1/28/2020   What is the primary reason for your visit?: Physical

## 2020-02-14 NOTE — PATIENT INSTRUCTIONS
Calorie Counting for Weight Loss  Calories are units of energy. Your body needs a certain amount of calories from food to keep you going throughout the day. When you eat more calories than your body needs, your body stores the extra calories as fat. When you eat fewer calories than your body needs, your body burns fat to get the energy it needs.  Calorie counting means keeping track of how many calories you eat and drink each day. Calorie counting can be helpful if you need to lose weight. If you make sure to eat fewer calories than your body needs, you should lose weight. Ask your health care provider what a healthy weight is for you.  For calorie counting to work, you will need to eat the right number of calories in a day in order to lose a healthy amount of weight per week. A dietitian can help you determine how many calories you need in a day and will give you suggestions on how to reach your calorie goal.  · A healthy amount of weight to lose per week is usually 1-2 lb (0.5-0.9 kg). This usually means that your daily calorie intake should be reduced by 500-750 calories.  · Eating 1,200 - 1,500 calories per day can help most women lose weight.  · Eating 1,500 - 1,800 calories per day can help most men lose weight.  What is my plan?  My goal is to have __________ calories per day.  If I have this many calories per day, I should lose around __________ pounds per week.  What do I need to know about calorie counting?  In order to meet your daily calorie goal, you will need to:  · Find out how many calories are in each food you would like to eat. Try to do this before you eat.  · Decide how much of the food you plan to eat.  · Write down what you ate and how many calories it had. Doing this is called keeping a food log.  To successfully lose weight, it is important to balance calorie counting with a healthy lifestyle that includes regular activity. Aim for 150 minutes of moderate exercise (such as walking) or 75  minutes of vigorous exercise (such as running) each week.  Where do I find calorie information?    The number of calories in a food can be found on a Nutrition Facts label. If a food does not have a Nutrition Facts label, try to look up the calories online or ask your dietitian for help.  Remember that calories are listed per serving. If you choose to have more than one serving of a food, you will have to multiply the calories per serving by the amount of servings you plan to eat. For example, the label on a package of bread might say that a serving size is 1 slice and that there are 90 calories in a serving. If you eat 1 slice, you will have eaten 90 calories. If you eat 2 slices, you will have eaten 180 calories.  How do I keep a food log?  Immediately after each meal, record the following information in your food log:  · What you ate. Don't forget to include toppings, sauces, and other extras on the food.  · How much you ate. This can be measured in cups, ounces, or number of items.  · How many calories each food and drink had.  · The total number of calories in the meal.  Keep your food log near you, such as in a small notebook in your pocket, or use a mobile tommy or website. Some programs will calculate calories for you and show you how many calories you have left for the day to meet your goal.  What are some calorie counting tips?    · Use your calories on foods and drinks that will fill you up and not leave you hungry:  ? Some examples of foods that fill you up are nuts and nut butters, vegetables, lean proteins, and high-fiber foods like whole grains. High-fiber foods are foods with more than 5 g fiber per serving.  ? Drinks such as sodas, specialty coffee drinks, alcohol, and juices have a lot of calories, yet do not fill you up.  · Eat nutritious foods and avoid empty calories. Empty calories are calories you get from foods or beverages that do not have many vitamins or protein, such as candy, sweets, and  "soda. It is better to have a nutritious high-calorie food (such as an avocado) than a food with few nutrients (such as a bag of chips).  · Know how many calories are in the foods you eat most often. This will help you calculate calorie counts faster.  · Pay attention to calories in drinks. Low-calorie drinks include water and unsweetened drinks.  · Pay attention to nutrition labels for \"low fat\" or \"fat free\" foods. These foods sometimes have the same amount of calories or more calories than the full fat versions. They also often have added sugar, starch, or salt, to make up for flavor that was removed with the fat.  · Find a way of tracking calories that works for you. Get creative. Try different apps or programs if writing down calories does not work for you.  What are some portion control tips?  · Know how many calories are in a serving. This will help you know how many servings of a certain food you can have.  · Use a measuring cup to measure serving sizes. You could also try weighing out portions on a kitchen scale. With time, you will be able to estimate serving sizes for some foods.  · Take some time to put servings of different foods on your favorite plates, bowls, and cups so you know what a serving looks like.  · Try not to eat straight from a bag or box. Doing this can lead to overeating. Put the amount you would like to eat in a cup or on a plate to make sure you are eating the right portion.  · Use smaller plates, glasses, and bowls to prevent overeating.  · Try not to multitask (for example, watch TV or use your computer) while eating. If it is time to eat, sit down at a table and enjoy your food. This will help you to know when you are full. It will also help you to be aware of what you are eating and how much you are eating.  What are tips for following this plan?  Reading food labels  · Check the calorie count compared to the serving size. The serving size may be smaller than what you are used to " "eating.  · Check the source of the calories. Make sure the food you are eating is high in vitamins and protein and low in saturated and trans fats.  Shopping  · Read nutrition labels while you shop. This will help you make healthy decisions before you decide to purchase your food.  · Make a grocery list and stick to it.  Cooking  · Try to cook your favorite foods in a healthier way. For example, try baking instead of frying.  · Use low-fat dairy products.  Meal planning  · Use more fruits and vegetables. Half of your plate should be fruits and vegetables.  · Include lean proteins like poultry and fish.  How do I count calories when eating out?  · Ask for smaller portion sizes.  · Consider sharing an entree and sides instead of getting your own entree.  · If you get your own entree, eat only half. Ask for a box at the beginning of your meal and put the rest of your entree in it so you are not tempted to eat it.  · If calories are listed on the menu, choose the lower calorie options.  · Choose dishes that include vegetables, fruits, whole grains, low-fat dairy products, and lean protein.  · Choose items that are boiled, broiled, grilled, or steamed. Stay away from items that are buttered, battered, fried, or served with cream sauce. Items labeled \"crispy\" are usually fried, unless stated otherwise.  · Choose water, low-fat milk, unsweetened iced tea, or other drinks without added sugar. If you want an alcoholic beverage, choose a lower calorie option such as a glass of wine or light beer.  · Ask for dressings, sauces, and syrups on the side. These are usually high in calories, so you should limit the amount you eat.  · If you want a salad, choose a garden salad and ask for grilled meats. Avoid extra toppings like leach, cheese, or fried items. Ask for the dressing on the side, or ask for olive oil and vinegar or lemon to use as dressing.  · Estimate how many servings of a food you are given. For example, a serving of " cooked rice is ½ cup or about the size of half a baseball. Knowing serving sizes will help you be aware of how much food you are eating at restaurants. The list below tells you how big or small some common portion sizes are based on everyday objects:  ? 1 oz--4 stacked dice.  ? 3 oz--1 deck of cards.  ? 1 tsp--1 die.  ? 1 Tbsp--½ a ping-pong ball.  ? 2 Tbsp--1 ping-pong ball.  ? ½ cup--½ baseball.  ? 1 cup--1 baseball.  Summary  · Calorie counting means keeping track of how many calories you eat and drink each day. If you eat fewer calories than your body needs, you should lose weight.  · A healthy amount of weight to lose per week is usually 1-2 lb (0.5-0.9 kg). This usually means reducing your daily calorie intake by 500-750 calories.  · The number of calories in a food can be found on a Nutrition Facts label. If a food does not have a Nutrition Facts label, try to look up the calories online or ask your dietitian for help.  · Use your calories on foods and drinks that will fill you up, and not on foods and drinks that will leave you hungry.  · Use smaller plates, glasses, and bowls to prevent overeating.  This information is not intended to replace advice given to you by your health care provider. Make sure you discuss any questions you have with your health care provider.  Document Released: 12/18/2006 Document Revised: 09/06/2019 Document Reviewed: 11/17/2017  [employer] Patient Education © 2019 [employer]      Exercising to Lose Weight  Exercise is structured, repetitive physical activity to improve fitness and health. Getting regular exercise is important for everyone. It is especially important if you are overweight. Being overweight increases your risk of heart disease, stroke, diabetes, high blood pressure, and several types of cancer. Reducing your calorie intake and exercising can help you lose weight.  Exercise is usually categorized as moderate or vigorous intensity. To lose weight, most  people need to do a certain amount of moderate-intensity or vigorous-intensity exercise each week.  Moderate-intensity exercise    Moderate-intensity exercise is any activity that gets you moving enough to burn at least three times more energy (calories) than if you were sitting.  Examples of moderate exercise include:  · Walking a mile in 15 minutes.  · Doing light yard work.  · Biking at an easy pace.  Most people should get at least 150 minutes (2 hours and 30 minutes) a week of moderate-intensity exercise to maintain their body weight.  Vigorous-intensity exercise  Vigorous-intensity exercise is any activity that gets you moving enough to burn at least six times more calories than if you were sitting. When you exercise at this intensity, you should be working hard enough that you are not able to carry on a conversation.  Examples of vigorous exercise include:  · Running.  · Playing a team sport, such as football, basketball, and soccer.  · Jumping rope.  Most people should get at least 75 minutes (1 hour and 15 minutes) a week of vigorous-intensity exercise to maintain their body weight.  How can exercise affect me?  When you exercise enough to burn more calories than you eat, you lose weight. Exercise also reduces body fat and builds muscle. The more muscle you have, the more calories you burn. Exercise also:  · Improves mood.  · Reduces stress and tension.  · Improves your overall fitness, flexibility, and endurance.  · Increases bone strength.  The amount of exercise you need to lose weight depends on:  · Your age.  · The type of exercise.  · Any health conditions you have.  · Your overall physical ability.  Talk to your health care provider about how much exercise you need and what types of activities are safe for you.  What actions can I take to lose weight?  Nutrition    · Make changes to your diet as told by your health care provider or diet and nutrition specialist (dietitian). This may  include:  ? Eating fewer calories.  ? Eating more protein.  ? Eating less unhealthy fats.  ? Eating a diet that includes fresh fruits and vegetables, whole grains, low-fat dairy products, and lean protein.  ? Avoiding foods with added fat, salt, and sugar.  · Drink plenty of water while you exercise to prevent dehydration or heat stroke.  Activity  · Choose an activity that you enjoy and set realistic goals. Your health care provider can help you make an exercise plan that works for you.  · Exercise at a moderate or vigorous intensity most days of the week.  ? The intensity of exercise may vary from person to person. You can tell how intense a workout is for you by paying attention to your breathing and heartbeat. Most people will notice their breathing and heartbeat get faster with more intense exercise.  · Do resistance training twice each week, such as:  ? Push-ups.  ? Sit-ups.  ? Lifting weights.  ? Using resistance bands.  · Getting short amounts of exercise can be just as helpful as long structured periods of exercise. If you have trouble finding time to exercise, try to include exercise in your daily routine.  ? Get up, stretch, and walk around every 30 minutes throughout the day.  ? Go for a walk during your lunch break.  ? Park your car farther away from your destination.  ? If you take public transportation, get off one stop early and walk the rest of the way.  ? Make phone calls while standing up and walking around.  ? Take the stairs instead of elevators or escalators.  · Wear comfortable clothes and shoes with good support.  · Do not exercise so much that you hurt yourself, feel dizzy, or get very short of breath.  Where to find more information  · [place] Department of Health and Human Services: www.hhs.gov  · Centers for Disease Control and Prevention (CDC): www.cdc.gov  Contact a health care provider:  · Before starting a new exercise program.  · If you have questions or concerns about your  weight.  · If you have a medical problem that keeps you from exercising.  Get help right away if you have any of the following while exercising:  · Injury.  · Dizziness.  · Difficulty breathing or shortness of breath that does not go away when you stop exercising.  · Chest pain.  · Rapid heartbeat.  Summary  · Being overweight increases your risk of heart disease, stroke, diabetes, high blood pressure, and several types of cancer.  · Losing weight happens when you burn more calories than you eat.  · Reducing the amount of calories you eat in addition to getting regular moderate or vigorous exercise each week helps you lose weight.  This information is not intended to replace advice given to you by your health care provider. Make sure you discuss any questions you have with your health care provider.  Document Released: 01/20/2012 Document Revised: 12/31/2018 Document Reviewed: 12/31/2018  [employer] Patient Education © 2019 [employer]

## 2020-03-05 ENCOUNTER — TRANSCRIBE ORDERS (OUTPATIENT)
Dept: LAB | Facility: HOSPITAL | Age: 45
End: 2020-03-05

## 2020-03-05 ENCOUNTER — LAB (OUTPATIENT)
Dept: LAB | Facility: HOSPITAL | Age: 45
End: 2020-03-05

## 2020-03-05 DIAGNOSIS — Z23 NEED FOR PROPHYLACTIC VACCINATION AND INOCULATION AGAINST CHOLERA ALONE: ICD-10-CM

## 2020-03-05 DIAGNOSIS — Z23 NEED FOR PROPHYLACTIC VACCINATION AND INOCULATION AGAINST CHOLERA ALONE: Primary | ICD-10-CM

## 2020-03-05 LAB
BASOPHILS # BLD AUTO: 0.03 10*3/MM3 (ref 0–0.2)
BASOPHILS NFR BLD AUTO: 0.3 % (ref 0–1.5)
DEPRECATED RDW RBC AUTO: 40.8 FL (ref 37–54)
EOSINOPHIL # BLD AUTO: 0.18 10*3/MM3 (ref 0–0.4)
EOSINOPHIL NFR BLD AUTO: 1.8 % (ref 0.3–6.2)
ERYTHROCYTE [DISTWIDTH] IN BLOOD BY AUTOMATED COUNT: 12.4 % (ref 12.3–15.4)
ERYTHROCYTE [SEDIMENTATION RATE] IN BLOOD: 57 MM/HR (ref 0–20)
HCT VFR BLD AUTO: 42.6 % (ref 34–46.6)
HGB BLD-MCNC: 14.4 G/DL (ref 12–15.9)
IMM GRANULOCYTES # BLD AUTO: 0.02 10*3/MM3 (ref 0–0.05)
IMM GRANULOCYTES NFR BLD AUTO: 0.2 % (ref 0–0.5)
LYMPHOCYTES # BLD AUTO: 3.03 10*3/MM3 (ref 0.7–3.1)
LYMPHOCYTES NFR BLD AUTO: 30.9 % (ref 19.6–45.3)
MCH RBC QN AUTO: 30.2 PG (ref 26.6–33)
MCHC RBC AUTO-ENTMCNC: 33.8 G/DL (ref 31.5–35.7)
MCV RBC AUTO: 89.3 FL (ref 79–97)
MONOCYTES # BLD AUTO: 0.77 10*3/MM3 (ref 0.1–0.9)
MONOCYTES NFR BLD AUTO: 7.8 % (ref 5–12)
NEUTROPHILS # BLD AUTO: 5.79 10*3/MM3 (ref 1.7–7)
NEUTROPHILS NFR BLD AUTO: 59 % (ref 42.7–76)
NRBC BLD AUTO-RTO: 0 /100 WBC (ref 0–0.2)
PLATELET # BLD AUTO: 365 10*3/MM3 (ref 140–450)
PMV BLD AUTO: 10.5 FL (ref 6–12)
RBC # BLD AUTO: 4.77 10*6/MM3 (ref 3.77–5.28)
WBC NRBC COR # BLD: 9.82 10*3/MM3 (ref 3.4–10.8)

## 2020-03-05 PROCEDURE — 80053 COMPREHEN METABOLIC PANEL: CPT

## 2020-03-05 PROCEDURE — 85652 RBC SED RATE AUTOMATED: CPT

## 2020-03-05 PROCEDURE — 85025 COMPLETE CBC W/AUTO DIFF WBC: CPT

## 2020-03-05 PROCEDURE — 36415 COLL VENOUS BLD VENIPUNCTURE: CPT

## 2020-03-05 PROCEDURE — 86480 TB TEST CELL IMMUN MEASURE: CPT

## 2020-03-05 PROCEDURE — 80074 ACUTE HEPATITIS PANEL: CPT

## 2020-03-06 LAB
ALBUMIN SERPL-MCNC: 3.9 G/DL (ref 3.5–5.2)
ALBUMIN/GLOB SERPL: 1.2 G/DL
ALP SERPL-CCNC: 103 U/L (ref 39–117)
ALT SERPL W P-5'-P-CCNC: 16 U/L (ref 1–33)
ANION GAP SERPL CALCULATED.3IONS-SCNC: 15.3 MMOL/L (ref 5–15)
AST SERPL-CCNC: 16 U/L (ref 1–32)
BILIRUB SERPL-MCNC: 0.2 MG/DL (ref 0.2–1.2)
BUN BLD-MCNC: 11 MG/DL (ref 6–20)
BUN/CREAT SERPL: 19.6 (ref 7–25)
CALCIUM SPEC-SCNC: 9.2 MG/DL (ref 8.6–10.5)
CHLORIDE SERPL-SCNC: 101 MMOL/L (ref 98–107)
CO2 SERPL-SCNC: 20.7 MMOL/L (ref 22–29)
CREAT BLD-MCNC: 0.56 MG/DL (ref 0.57–1)
GFR SERPL CREATININE-BSD FRML MDRD: 118 ML/MIN/1.73
GLOBULIN UR ELPH-MCNC: 3.3 GM/DL
GLUCOSE BLD-MCNC: 77 MG/DL (ref 65–99)
HAV IGM SERPL QL IA: NORMAL
HBV CORE IGM SERPL QL IA: NORMAL
HBV SURFACE AG SERPL QL IA: NORMAL
HCV AB SER DONR QL: NORMAL
HOLD SPECIMEN: NORMAL
POTASSIUM BLD-SCNC: 4.3 MMOL/L (ref 3.5–5.2)
PROT SERPL-MCNC: 7.2 G/DL (ref 6–8.5)
SODIUM BLD-SCNC: 137 MMOL/L (ref 136–145)

## 2020-03-07 LAB
QUANTIFERON CRITERIA: NORMAL
QUANTIFERON INCUBATION: NORMAL
QUANTIFERON MITOGEN VALUE: >10 IU/ML
QUANTIFERON NIL VALUE: 0.02 IU/ML
QUANTIFERON TB1 AG VALUE: 0.03 IU/ML
QUANTIFERON TB2 AG VALUE: 0.03 IU/ML
QUANTIFERON-TB GOLD PLUS: NEGATIVE

## 2020-04-27 ENCOUNTER — HOSPITAL ENCOUNTER (OUTPATIENT)
Dept: ULTRASOUND IMAGING | Facility: HOSPITAL | Age: 45
Discharge: HOME | End: 2020-04-27
Admitting: INTERNAL MEDICINE

## 2020-04-27 DIAGNOSIS — M79.89 LEFT LEG SWELLING: ICD-10-CM

## 2020-04-27 PROCEDURE — 93971 EXTREMITY STUDY: CPT

## 2020-05-01 DIAGNOSIS — F32.A DEPRESSION, UNSPECIFIED DEPRESSION TYPE: ICD-10-CM

## 2020-05-01 RX ORDER — BUPROPION HYDROCHLORIDE 150 MG/1
150 TABLET ORAL DAILY
Qty: 30 TABLET | Refills: 4 | Status: SHIPPED | OUTPATIENT
Start: 2020-05-01

## 2020-05-19 ENCOUNTER — LAB (OUTPATIENT)
Dept: LAB | Facility: HOSPITAL | Age: 45
End: 2020-05-19

## 2020-05-19 DIAGNOSIS — M05.79 RHEUMATOID ARTHRITIS WITH RHEUMATOID FACTOR OF MULTIPLE SITES WITHOUT ORGAN OR SYSTEMS INVOLVEMENT (HCC): Primary | ICD-10-CM

## 2020-05-19 PROCEDURE — 86140 C-REACTIVE PROTEIN: CPT

## 2020-05-19 PROCEDURE — 80053 COMPREHEN METABOLIC PANEL: CPT

## 2020-05-19 PROCEDURE — 85025 COMPLETE CBC W/AUTO DIFF WBC: CPT

## 2020-05-19 PROCEDURE — 85651 RBC SED RATE NONAUTOMATED: CPT

## 2020-05-20 LAB
ALBUMIN SERPL-MCNC: 3.7 G/DL (ref 3.5–5.2)
ALBUMIN/GLOB SERPL: 1.3 G/DL
ALP SERPL-CCNC: 97 U/L (ref 39–117)
ALT SERPL W P-5'-P-CCNC: 66 U/L (ref 1–33)
ANION GAP SERPL CALCULATED.3IONS-SCNC: 12 MMOL/L (ref 5–15)
AST SERPL-CCNC: 43 U/L (ref 1–32)
BASOPHILS # BLD AUTO: 0.05 10*3/MM3 (ref 0–0.2)
BASOPHILS NFR BLD AUTO: 0.4 % (ref 0–1.5)
BILIRUB SERPL-MCNC: 0.2 MG/DL (ref 0.2–1.2)
BUN BLD-MCNC: 14 MG/DL (ref 6–20)
BUN/CREAT SERPL: 20.3 (ref 7–25)
CALCIUM SPEC-SCNC: 8.5 MG/DL (ref 8.6–10.5)
CHLORIDE SERPL-SCNC: 103 MMOL/L (ref 98–107)
CO2 SERPL-SCNC: 24 MMOL/L (ref 22–29)
CREAT BLD-MCNC: 0.69 MG/DL (ref 0.57–1)
CRP SERPL-MCNC: 0.8 MG/DL (ref 0–0.5)
DEPRECATED RDW RBC AUTO: 46.9 FL (ref 37–54)
EOSINOPHIL # BLD AUTO: 0.2 10*3/MM3 (ref 0–0.4)
EOSINOPHIL NFR BLD AUTO: 1.6 % (ref 0.3–6.2)
ERYTHROCYTE [DISTWIDTH] IN BLOOD BY AUTOMATED COUNT: 13.9 % (ref 12.3–15.4)
ERYTHROCYTE [SEDIMENTATION RATE] IN BLOOD: 22 MM/HR (ref 0–20)
GFR SERPL CREATININE-BSD FRML MDRD: 93 ML/MIN/1.73
GLOBULIN UR ELPH-MCNC: 2.9 GM/DL
GLUCOSE BLD-MCNC: 104 MG/DL (ref 65–99)
HCT VFR BLD AUTO: 41.4 % (ref 34–46.6)
HGB BLD-MCNC: 13.7 G/DL (ref 12–15.9)
IMM GRANULOCYTES # BLD AUTO: 0.08 10*3/MM3 (ref 0–0.05)
IMM GRANULOCYTES NFR BLD AUTO: 0.7 % (ref 0–0.5)
LYMPHOCYTES # BLD AUTO: 2.98 10*3/MM3 (ref 0.7–3.1)
LYMPHOCYTES NFR BLD AUTO: 24.5 % (ref 19.6–45.3)
MCH RBC QN AUTO: 31.1 PG (ref 26.6–33)
MCHC RBC AUTO-ENTMCNC: 33.1 G/DL (ref 31.5–35.7)
MCV RBC AUTO: 93.9 FL (ref 79–97)
MONOCYTES # BLD AUTO: 0.96 10*3/MM3 (ref 0.1–0.9)
MONOCYTES NFR BLD AUTO: 7.9 % (ref 5–12)
NEUTROPHILS # BLD AUTO: 7.87 10*3/MM3 (ref 1.7–7)
NEUTROPHILS NFR BLD AUTO: 64.9 % (ref 42.7–76)
NRBC BLD AUTO-RTO: 0 /100 WBC (ref 0–0.2)
PLATELET # BLD AUTO: 337 10*3/MM3 (ref 140–450)
PMV BLD AUTO: 10.7 FL (ref 6–12)
POTASSIUM BLD-SCNC: 4.7 MMOL/L (ref 3.5–5.2)
PROT SERPL-MCNC: 6.6 G/DL (ref 6–8.5)
RBC # BLD AUTO: 4.41 10*6/MM3 (ref 3.77–5.28)
SODIUM BLD-SCNC: 139 MMOL/L (ref 136–145)
WBC NRBC COR # BLD: 12.14 10*3/MM3 (ref 3.4–10.8)

## 2020-05-22 ENCOUNTER — TELEPHONE (OUTPATIENT)
Dept: FAMILY MEDICINE CLINIC | Facility: CLINIC | Age: 45
End: 2020-05-22

## 2020-05-22 NOTE — TELEPHONE ENCOUNTER
Patient called in requesting a doctors note stating she is not able to wear a mask due to her asthma. Patient states the mask triggers her asthma and does not want to argue with her employer about it. Patient would like to receive this note by Tuesday and would like it faxed to her employer.   [phone]    For any questions or issues, please call patient back at [phone]

## 2020-05-26 ENCOUNTER — TELEPHONE (OUTPATIENT)
Dept: FAMILY MEDICINE CLINIC | Facility: CLINIC | Age: 45
End: 2020-05-26

## 2020-05-26 NOTE — TELEPHONE ENCOUNTER
PT CALLED IN STATING THAT SHE WAS SUPPOSE TO RECEIVE A LETTER FOR HER JOB BY TODAY 5/26/20 AND STILL HAS NOT RECEIVED IT. PT WANTS THE LETTER SENT TO HER ON [employer]    PT CONTACT: [phone]

## 2020-06-08 DIAGNOSIS — J45.30 MILD PERSISTENT REACTIVE AIRWAY DISEASE WITH WHEEZING WITHOUT COMPLICATION: ICD-10-CM

## 2020-06-08 RX ORDER — MONTELUKAST SODIUM 10 MG/1
10 TABLET ORAL NIGHTLY
Qty: 30 TABLET | Refills: 2 | Status: SHIPPED | OUTPATIENT
Start: 2020-06-08

## 2021-12-03 ENCOUNTER — TRANSCRIBE ORDERS (OUTPATIENT)
Dept: PHYSICAL THERAPY | Facility: HOSPITAL | Age: 46
End: 2021-12-03

## 2021-12-03 DIAGNOSIS — M25.552 LEFT HIP PAIN: ICD-10-CM

## 2021-12-03 DIAGNOSIS — M25.562 LEFT KNEE PAIN, UNSPECIFIED CHRONICITY: Primary | ICD-10-CM

## 2021-12-17 ENCOUNTER — HOSPITAL ENCOUNTER (OUTPATIENT)
Dept: PHYSICAL THERAPY | Facility: HOSPITAL | Age: 46
Setting detail: THERAPIES SERIES
Discharge: HOME | End: 2021-12-17

## 2021-12-17 DIAGNOSIS — M25.552 LEFT HIP PAIN: ICD-10-CM

## 2021-12-17 DIAGNOSIS — M25.50 ARTHRALGIA OF MULTIPLE JOINTS: ICD-10-CM

## 2021-12-17 DIAGNOSIS — E66.01 MORBID OBESITY WITH BMI OF 50.0-59.9, ADULT (HCC): ICD-10-CM

## 2021-12-17 DIAGNOSIS — M25.562 LEFT KNEE PAIN, UNSPECIFIED CHRONICITY: Primary | ICD-10-CM

## 2021-12-17 PROCEDURE — 97110 THERAPEUTIC EXERCISES: CPT | Performed by: PHYSICAL THERAPIST

## 2021-12-17 PROCEDURE — 97162 PT EVAL MOD COMPLEX 30 MIN: CPT | Performed by: PHYSICAL THERAPIST

## 2021-12-17 NOTE — THERAPY EVALUATION
Outpatient Physical Therapy Ortho Initial Evaluation  [facility]     Patient Name: Ashley John  : 1975  MRN: [id number]  Today's Date: 2021      Visit Date: 2021     Patient seen for 1 PT sessions.  Patient reports N/A% of improvement.  Next MD appt: TAMARA.  Recertification: 2022.    Therapy Diagnosis: L Knee pain/ITBS      Patient Active Problem List   Diagnosis   • Morbid obesity with BMI of 50.0-59.9, adult ([facility])   • Acute sinusitis   • Acute bronchitis   • Right wrist tendinitis   • Asthma   • Pain of upper abdomen   • Gastroesophageal reflux disease   • Diarrhea   • Vitamin D deficiency   • Change in bowel habits   • Early satiety   • Arthralgia of multiple joints   • Reactive airway disease with wheezing        Past Medical History:   Diagnosis Date   • Arthritis    • Asthma    • Fibrocystic breast    • Morbid obesity with BMI of 50.0-59.9, adult ([facility]) 5/10/2017        Past Surgical History:   Procedure Laterality Date   • COLONOSCOPY N/A 2019    Procedure: COLONOSCOPY--bx;  Surgeon: Jayme Poon MD;  Location: [facility] ENDOSCOPY;  Service: Gastroenterology   • ENDOSCOPY N/A 2019    Procedure: ESOPHAGOGASTRODUODENOSCOPY--bx;  Surgeon: Jayme Poon MD;  Location: [facility] ENDOSCOPY;  Service: Gastroenterology   • UPPER GASTROINTESTINAL ENDOSCOPY  2019       Visit Dx:     ICD-10-CM ICD-9-CM   1. Left knee pain, unspecified chronicity  M25.562 719.46   2. Left hip pain  M25.552 719.45   3. Arthralgia of multiple joints  M25.50 719.49   4. Morbid obesity with BMI of 50.0-59.9, adult ([facility])  E66.01 278.01    Z68.43 V85.43          Patient History     Row Name 21 0700             History    Chief Complaint Pain  -AJ      Type of Pain Knee pain; Hip pain  -AJ      Date Current Problem(s) Began --  Chronic, years  -AJ      Brief Description of Current Complaint Patient reports she was diagnosed with RA about 2 years ago and thought it was that. She  "reports her RA is now under control but the L knee is still really bothering her. She reports moving her L hip around will make her L knee feel better. She reprots it cracks and walking really bothers it.  -AJ      Previous treatment for THIS PROBLEM Injections; Rehabilitation  -AJ      Patient/Caregiver Goals Relieve pain; Know what to do to help the symptoms  -AJ      Patient/Caregiver Goals Comment Walking and going back to crossfit  -AJ      Current Tobacco Use ~1/2ppd  -AJ      Smoking Status Daily smoker  -AJ      Patient's Rating of General Health Good  -AJ      Occupation/sports/leisure activities Occupation: [employer] ; Hobbies: walking  -AJ      What clinical tests have you had for this problem? X-ray  -AJ      Results of Clinical Tests see EMR  -AJ      History of Previous Related Injuries ~1 year ago she had a fall outside at work and slipped and landed on that knee  -AJ              Pain     Pain Location Knee  -AJ      Pain at Present 0  -AJ      Pain at Best 0  -AJ      Pain at Worst 8  -AJ      Pain Frequency Intermittent  -AJ      Pain Description Sharp; Stabbing; Discomfort; Pressure  \"stiffness\"  -AJ      What Performance Factors Make the Current Problem(s) WORSE? walking, standing a lot  -AJ      What Performance Factors Make the Current Problem(s) BETTER? hot epison salt baths, anti-inflamatories, rest  -AJ      Is your sleep disturbed? Yes  -AJ      Is medication used to assist with sleep? Yes  -AJ            User Key  (r) = Recorded By, (t) = Taken By, (c) = Cosigned By    Initials Name Provider Type    AJ Irais Kenney, PT DPT Physical Therapist                 PT Ortho     Row Name 12/17/21 0700       Subjective Comments    Subjective Comments see history  -AJ       Subjective Pain    Able to rate subjective pain? yes  -AJ    Pre-Treatment Pain Level 0  -AJ    Post-Treatment Pain Level 0  -AJ    Subjective Pain Comment \"sore\"  -AJ       Posture/Observations    " Alignment Options Genu valgus  -AJ    Genu valgus Left:; Moderate; Right:; Mild; Increased  -AJ    Observations Edema  B Les, global  -AJ       Knee Palpation    Lateral Joint Line Left:; Tender  -AJ    ITB Left:; Tender; Guarded/taut  -AJ       Knee Special Tests    Anterior drawer (ACL lesion) Bilateral:; Negative  -AJ    Posterior drawer (PCL lesion) Bilateral:; Negative  -AJ    Valgus stress (MCL lesion) Bilateral:; Negative  -AJ    Varus stress (LCL lesion) Bilateral:; Negative  -AJ    Apley’s distraction test (meniscal lesion vs OA) Bilateral:; Negative  -AJ    Apley’s compression test (meniscal lesion vs OA) Bilateral:; Negative  -AJ    Patellar grind test (chondromalacia patella) Bilateral:; Negative  -AJ       Right Lower Ext    Rt Knee Extension/Flexion AROM 5°-122°  -AJ       Left Lower Ext    Lt Knee Extension/Flexion AROM 0°-110°  -AJ       MMT (Manual Muscle Testing)    General MMT Comments B LE 5/5, except L hip flexion 4+/5  -AJ       Sensation    Sensation WNL? WNL  -AJ    Light Touch No apparent deficits  -AJ       Lower Extremity Flexibility    ITB Left:; Moderately limited; Right:; Mildly limited  -AJ       Pathomechanics    Lower Extremity Pathomechanics Pops knee into hyperextension with midstance  -AJ       Transfers    Comment (Transfers) I with all transfers, unweights L LE some with sit to/from stand  -AJ       Gait/Stairs (Locomotion)    [place] Level (Gait) independent  -AJ    Deviations/Abnormal Patterns (Gait) base of support, wide; antalgic  -AJ    Comment (Gait/Stairs) Wide ALBERTO with increased weight shifting and waddle due to obesity  -AJ          User Key  (r) = Recorded By, (t) = Taken By, (c) = Cosigned By    Initials Name Provider Type    Irais Cantor, PT DPT Physical Therapist                            Therapy Education  Given: HEP, Symptoms/condition management, Pain management, Posture/body mechanics (POC)  Program: New  How Provided: Verbal, Demonstration,  Written  Provided to: Patient  Level of Understanding: Verbalized, Demonstrated      PT OP Goals     Row Name 12/17/21 0700          PT Short Term Goals    STG 1 Patient I with HEP and have additions/changes by next recertification.  -     STG 2 AROM L knee flexion >= 120°.  -     STG 3 Patient able to perform sit to/from stand with no UE A = WB B LE x20, no increase in pain.  -     STG 4 Patient able to control hyperextension present in B knees  -     STG 5 Patient able to perform 20 SLR with  ER with  no lag present  -            Long Term Goals    LTG 1 B LE 5/5.  -     LTG 2 Patient able to ascend/descend 3 steps reciprocally with 1 hand rail assist, no increase in pain x10 reps.  -     LTG 3 Patient able to ambulate with no assistive device non-antalgic >= 1/4 mile.  -     LTG 4 Patient able to retrieve object off the floor with a squat with no increas ein pain x5 reps.  -     LTG 5 Improved ITB flexibility B.  -     LTG 6 I with final HEP.  -            Time Calculation    PT Goal Re-Cert Due Date 01/07/22  -           User Key  (r) = Recorded By, (t) = Taken By, (c) = Cosigned By    Initials Name Provider Type    Irais Cantor, PT DPT Physical Therapist                Barriers to Rehab: Include significant or possible arthritic/degenerative changes that have occurred within the joint, The chronicity of this issue, The patient's obesity.    Safety Issues: None noted.        PT Assessment/Plan     Row Name 12/17/21 0700          PT Assessment    Functional Limitations Limitation in home management; Limitations in community activities; Performance in leisure activities; Performance in self-care ADL  -     Impairments Endurance; Gait; Impaired flexibility; Balance; Range of motion; Posture; Poor body mechanics; Pain; Muscle strength; Joint mobility; Joint integrity; Impaired muscle endurance  -     Assessment Comments Patient is a orbidly obese 44yo female who presents to  "the clinic toda with complaints of L knee pain. She has significant B genu valgus and B knee hyperextension. She has poor VMO muscle mass and activation also. Also signficant tightness in L>R ITB. Skilled PT with address deficits listed.Patient did well with all HEP exercises and written copies were provided to the patient.  -     Rehab Potential Fair  -     Patient/caregiver participated in establishment of treatment plan and goals Yes  -     Patient would benefit from skilled therapy intervention Yes  -AJ            PT Plan    PT Frequency 2x/week  -     Predicted Duration of Therapy Intervention (PT) 6-10 visits  -     Planned CPT's? PT EVAL MOD COMPLELITY: 41285; PT RE-EVAL: 11502; PT THER PROC EA 15 MIN: 33713; PT THER ACT EA 15 MIN: 76767; PT MANUAL THERAPY EA 15 MIN: 31847; PT NEUROMUSC RE-EDUCATION EA 15 MIN: 54327; PT GAIT TRAINING EA 15 MIN: 67706; PT SELF CARE/HOME MGMT/TRAIN EA 15: 78681; PT HOT OR COLD PACK TREAT MCARE; PT THER SUPP EA 15 MIN  -     PT Plan Comments Progress overall ROM, strength, flexibility, ergonomics, and joint protection.  -           User Key  (r) = Recorded By, (t) = Taken By, (c) = Cosigned By    Initials Name Provider Type    Irais Cantor, PT DPT Physical Therapist            Other therapeutic activities and/or exercises will be prescribed depending on the patient's progress or lack thereof.         OP Exercises     Row Name 12/17/21 0700             Subjective Comments    Subjective Comments see history  -              Subjective Pain    Able to rate subjective pain? yes  -      Pre-Treatment Pain Level 0  -      Post-Treatment Pain Level 0  -      Subjective Pain Comment \"sore\"  -              Exercise 1    Exercise Name 1 B St. ITB S  -      Reps 1 2  -      Time 1 30 seconds  -              Exercise 2    Exercise Name 2 B QS with hyperextension control  -      Reps 2 20  -      Time 2 5\" hold  -              Exercise 3    " "Exercise Name 3 L LAQ  -AJ      Sets 3 2  -AJ      Reps 3 10  -AJ      Time 3 5\" hold  -AJ              Exercise 4    Exercise Name 4 Sit to/from stand  -AJ      Sets 4 2  -AJ      Reps 4 10  -AJ            User Key  (r) = Recorded By, (t) = Taken By, (c) = Cosigned By    Initials Name Provider Type    Irais Cantor, PT DPT Physical Therapist                              Outcome Measure Options: Lower Extremity Functional Scale (LEFS)  Lower Extremity Functional Index  Any of your usual work, housework or school activities: Quite a bit of difficulty  Your usual hobbies, recreational or sporting activities: Extreme difficulty or unable to perform activity  Getting into or out of the bath: Moderate difficulty  Walking between rooms: Moderate difficulty  Putting on your shoes or socks: Moderate difficulty  Squatting: Quite a bit of difficulty  Lifting an object, like a bag of groceries from the floor: A little bit of difficulty  Performing light activities around your home: Moderate difficulty  Performing heavy activities around your home: Quite a bit of difficulty  Getting into or out of a car: No difficulty  Walking 2 blocks: Extreme difficulty or unable to perform activity  Walking a mile: Extreme difficulty or unable to perform activity  Going up or down 10 stairs (about 1 flight of stairs): Quite a bit of difficulty  Standing for 1 hour: Extreme difficulty or unable to perform activity  Sitting for 1 hour: Extreme difficulty or unable to perform activity  Running on even ground: Extreme difficulty or unable to perform activity  Running on uneven ground: Extreme difficulty or unable to perform activity  Making sharp turns while running fast: Extreme difficulty or unable to perform activity  Hopping: Extreme difficulty or unable to perform activity  Rolling over in bed: A little bit of difficulty  Total: 22      Time Calculation:     Start Time: 0742  Stop Time: 0829  Time Calculation (min): 47 min  Total " Timed Code Minutes- PT: 9 minute(s)     Therapy Charges for Today     Code Description Service Date Service Provider Modifiers Qty    47338554029 HC PT EVAL MOD COMPLEXITY 3 12/17/2021 Irais Kenney, PT DPT GP 1    25018877353 HC PT THER SUPP EA 15 MIN 12/17/2021 Irais Kenney, PT DPT GP 1    27625537704 HC PT THER PROC EA 15 MIN 12/17/2021 Irais Kenney, PT DPT GP 1          PT G-Codes  Outcome Measure Options: Lower Extremity Functional Scale (LEFS)  Total: 22       This document has been electronically signed by Irais Kenney PT DPT, [facility] on December 17, 2021 09:35 CST

## 2021-12-18 ENCOUNTER — HOSPITAL ENCOUNTER (OUTPATIENT)
Dept: GENERAL RADIOLOGY | Facility: HOSPITAL | Age: 46
Discharge: HOME | End: 2021-12-18

## 2021-12-18 DIAGNOSIS — M05.79 RHEUMATOID ARTHRITIS WITH RHEUMATOID FACTOR OF MULTIPLE SITES WITHOUT ORGAN OR SYSTEMS INVOLVEMENT (HCC): ICD-10-CM

## 2021-12-18 DIAGNOSIS — M25.562 LEFT KNEE PAIN, UNSPECIFIED CHRONICITY: ICD-10-CM

## 2021-12-18 PROCEDURE — 73630 X-RAY EXAM OF FOOT: CPT

## 2021-12-18 PROCEDURE — 73562 X-RAY EXAM OF KNEE 3: CPT

## 2021-12-21 ENCOUNTER — HOSPITAL ENCOUNTER (OUTPATIENT)
Dept: PHYSICAL THERAPY | Facility: HOSPITAL | Age: 46
Setting detail: THERAPIES SERIES
Discharge: HOME | End: 2021-12-21

## 2021-12-21 DIAGNOSIS — E66.01 MORBID OBESITY WITH BMI OF 50.0-59.9, ADULT (HCC): ICD-10-CM

## 2021-12-21 DIAGNOSIS — M25.50 ARTHRALGIA OF MULTIPLE JOINTS: ICD-10-CM

## 2021-12-21 DIAGNOSIS — M25.562 LEFT KNEE PAIN, UNSPECIFIED CHRONICITY: Primary | ICD-10-CM

## 2021-12-21 DIAGNOSIS — M25.552 LEFT HIP PAIN: ICD-10-CM

## 2021-12-21 PROCEDURE — 97110 THERAPEUTIC EXERCISES: CPT | Performed by: PHYSICAL THERAPIST

## 2021-12-21 NOTE — THERAPY TREATMENT NOTE
Outpatient Physical Therapy Ortho Treatment Note  [facility]     Patient Name: Ashley John  : 1975  MRN: [id number]  Today's Date: 2021      Visit Date: 2021     Patient seen for 2 PT sessions.  Patient reports N/A% of improvement.  Next MD appt: TAMARA.  Recertification: 2022.     Therapy Diagnosis: L Knee pain/ITBS    Visit Dx:    ICD-10-CM ICD-9-CM   1. Left knee pain, unspecified chronicity  M25.562 719.46   2. Left hip pain  M25.552 719.45   3. Arthralgia of multiple joints  M25.50 719.49   4. Morbid obesity with BMI of 50.0-59.9, adult ([facility])  E66.01 278.01    Z68.43 V85.43       Patient Active Problem List   Diagnosis   • Morbid obesity with BMI of 50.0-59.9, adult ([facility])   • Acute sinusitis   • Acute bronchitis   • Right wrist tendinitis   • Asthma   • Pain of upper abdomen   • Gastroesophageal reflux disease   • Diarrhea   • Vitamin D deficiency   • Change in bowel habits   • Early satiety   • Arthralgia of multiple joints   • Reactive airway disease with wheezing        Past Medical History:   Diagnosis Date   • Arthritis    • Asthma    • Fibrocystic breast    • Morbid obesity with BMI of 50.0-59.9, adult ([facility]) 5/10/2017        Past Surgical History:   Procedure Laterality Date   • COLONOSCOPY N/A 2019    Procedure: COLONOSCOPY--bx;  Surgeon: Jayme Poon MD;  Location: [facility] ENDOSCOPY;  Service: Gastroenterology   • ENDOSCOPY N/A 2019    Procedure: ESOPHAGOGASTRODUODENOSCOPY--bx;  Surgeon: Jayme Poon MD;  Location: [facility] ENDOSCOPY;  Service: Gastroenterology   • UPPER GASTROINTESTINAL ENDOSCOPY  2019        PT Ortho     Row Name 21 0700       Subjective Comments    Subjective Comments Patient reports she is just stiff, no pain this morning. She reports she got new xrays with just degenerative changes found.  -AJ       Subjective Pain    Able to rate subjective pain? yes  -AJ    Pre-Treatment Pain Level 0  -AJ    Post-Treatment Pain  "Level 0  -AJ          User Key  (r) = Recorded By, (t) = Taken By, (c) = Cosigned By    Initials Name Provider Type    Irais Cantor, PT DPT Physical Therapist                             PT Assessment/Plan     Row Name 12/21/21 0700          PT Assessment    Assessment Comments Patient struggled with clams and SLR, but able to complete both. No lag with SLR, but difficulty raising leg. Issued clams shells for HEP.  -AJ            PT Plan    PT Frequency 2x/week  -AJ     PT Plan Comments Add SLR to HEP next session.  -AJ           User Key  (r) = Recorded By, (t) = Taken By, (c) = Cosigned By    Initials Name Provider Type    Irais Cantor, PT DPT Physical Therapist                   OP Exercises     Row Name 12/21/21 0700             Subjective Comments    Subjective Comments Patient reports she is just stiff, no pain this morning. She reports she got new xrays with just degenerative changes found.  -AJ              Subjective Pain    Able to rate subjective pain? yes  -AJ      Pre-Treatment Pain Level 0  -AJ      Post-Treatment Pain Level 0  -AJ              Exercise 1    Exercise Name 1 Pro II LE- strength/endurance  -AJ      Time 1 10 minutes  -AJ      Additional Comments L 4.0  -AJ              Exercise 2    Exercise Name 2 B St. ITB S  -AJ      Reps 2 2  -AJ      Time 2 30 seconds  -AJ              Exercise 3    Exercise Name 3 L LAQ  -AJ      Sets 3 2  -AJ      Reps 3 10  -AJ      Time 3 5\" hold  -AJ              Exercise 4    Exercise Name 4 Sit to/from stand  -AJ      Sets 4 2  -AJ      Reps 4 10  -AJ      Additional Comments No UE A, cues for improved eccentric control  -AJ              Exercise 5    Exercise Name 5 B SL Clams  -AJ      Sets 5 2  -AJ      Reps 5 10  -AJ      Time 5 5\" hold  -AJ      Additional Comments Added to HEP.  -AJ              Exercise 6    Exercise Name 6 B QS with hyperextension control  -AJ      Reps 6 20  -AJ      Time 6 5\" hold  -AJ              Exercise 7 " "   Exercise Name 7 L SLR  -      Sets 7 2  -      Reps 7 10  -      Time 7 5\" hold  -      Additional Comments Cues for eccentric control  -              Exercise 8    Exercise Name 8 B hip add isometric  -      Reps 8 20  -      Time 8 5\" hold  -            User Key  (r) = Recorded By, (t) = Taken By, (c) = Cosigned By    Initials Name Provider Type    Irais Cantor, PT DPT Physical Therapist                              PT OP Goals     Row Name 12/21/21 0700          PT Short Term Goals    STG 1 Patient I with HEP and have additions/changes by next recertification.  -     STG 1 Progress Partially Met; Ongoing  -     STG 2 AROM L knee flexion >= 120°.  -     STG 3 Patient able to perform sit to/from stand with no UE A = WB B LE x20, no increase in pain.  -     STG 4 Patient able to control hyperextension present in B knees  -     STG 5 Patient able to perform 20 SLR with  ER with  no lag present  -            Long Term Goals    LTG 1 B LE 5/5.  -     LTG 2 Patient able to ascend/descend 3 steps reciprocally with 1 hand rail assist, no increase in pain x10 reps.  -     LTG 3 Patient able to ambulate with no assistive device non-antalgic >= 1/4 mile.  -     LTG 4 Patient able to retrieve object off the floor with a squat with no increas ein pain x5 reps.  -     LTG 5 Improved ITB flexibility B.  -     LTG 6 I with final HEP.  -            Time Calculation    PT Goal Re-Cert Due Date 01/07/22  -           User Key  (r) = Recorded By, (t) = Taken By, (c) = Cosigned By    Initials Name Provider Type    Irais Cantor, PT DPT Physical Therapist                Therapy Education  Education Details: HEP: added clams to HEP.  Given: HEP  Program: Reinforced, New  How Provided: Verbal, Demonstration  Provided to: Patient  Level of Understanding: Teach back education performed, Verbalized, Demonstrated              Time Calculation:   Start Time: 0704  Stop Time: " 0746  Time Calculation (min): 42 min  Total Timed Code Minutes- PT: 42 minute(s)  Therapy Charges for Today     Code Description Service Date Service Provider Modifiers Qty    57623155840 HC PT THER SUPP EA 15 MIN 12/21/2021 Irais Kenney, PT DPT GP 1    95492907619 HC PT THER PROC EA 15 MIN 12/21/2021 Irais Kenney, PT DPT GP 3                    This document has been electronically signed by Irais Kenney, PT DPT, [facility] on December 21, 2021 07:51 CST

## 2021-12-23 ENCOUNTER — HOSPITAL ENCOUNTER (OUTPATIENT)
Dept: PHYSICAL THERAPY | Facility: HOSPITAL | Age: 46
Setting detail: THERAPIES SERIES
Discharge: HOME | End: 2021-12-23

## 2021-12-23 DIAGNOSIS — M25.562 LEFT KNEE PAIN, UNSPECIFIED CHRONICITY: Primary | ICD-10-CM

## 2021-12-23 DIAGNOSIS — M25.50 ARTHRALGIA OF MULTIPLE JOINTS: ICD-10-CM

## 2021-12-23 DIAGNOSIS — M25.552 LEFT HIP PAIN: ICD-10-CM

## 2021-12-23 DIAGNOSIS — E66.01 MORBID OBESITY WITH BMI OF 50.0-59.9, ADULT (HCC): ICD-10-CM

## 2021-12-23 PROCEDURE — 97110 THERAPEUTIC EXERCISES: CPT

## 2021-12-23 NOTE — THERAPY TREATMENT NOTE
Outpatient Physical Therapy Ortho Treatment Note  [facility]     Patient Name: Ashley John  : 1975  MRN: [id number]  Today's Date: 2021      Visit Date: 2021     Patient has attended 3 visits  n/a% Improvement  MD Visit: 03-  Recheck Date: 2022    Therapy Diagnosis: L Knee pain/ITBS    Visit Dx:    ICD-10-CM ICD-9-CM   1. Left knee pain, unspecified chronicity  M25.562 719.46   2. Left hip pain  M25.552 719.45   3. Arthralgia of multiple joints  M25.50 719.49   4. Morbid obesity with BMI of 50.0-59.9, adult ([facility])  E66.01 278.01    Z68.43 V85.43       Patient Active Problem List   Diagnosis   • Morbid obesity with BMI of 50.0-59.9, adult ([facility])   • Acute sinusitis   • Acute bronchitis   • Right wrist tendinitis   • Asthma   • Pain of upper abdomen   • Gastroesophageal reflux disease   • Diarrhea   • Vitamin D deficiency   • Change in bowel habits   • Early satiety   • Arthralgia of multiple joints   • Reactive airway disease with wheezing        Past Medical History:   Diagnosis Date   • Arthritis    • Asthma    • Fibrocystic breast    • Morbid obesity with BMI of 50.0-59.9, adult ([facility]) 5/10/2017        Past Surgical History:   Procedure Laterality Date   • COLONOSCOPY N/A 2019    Procedure: COLONOSCOPY--bx;  Surgeon: Jayme Poon MD;  Location: [facility] ENDOSCOPY;  Service: Gastroenterology   • ENDOSCOPY N/A 2019    Procedure: ESOPHAGOGASTRODUODENOSCOPY--bx;  Surgeon: Jayme Poon MD;  Location: [facility] ENDOSCOPY;  Service: Gastroenterology   • UPPER GASTROINTESTINAL ENDOSCOPY  2019        PT Ortho     Row Name 21 0700       Subjective Comments    Subjective Comments Pt reports she was stiff this am, but it has loosened up some.  -MP       Subjective Pain    Able to rate subjective pain? yes  -MP    Pre-Treatment Pain Level 2  -MP    Post-Treatment Pain Level 2  -MP          User Key  (r) = Recorded By, (t) = Taken By, (c) = Cosigned By     "Initials Name Provider Type    Colleen Duque PTA Physical Therapy Assistant                             PT Assessment/Plan     Row Name 12/23/21 0700          PT Assessment    Assessment Comments Pt requested increase resistance on Pro II; however, she exhibited poor tolerance due to pain/burning in her feet from RA per her report.  Pt states she had to stop using her bike at home because of this.  Pt required several cues to slow ex's down.  -MP            PT Plan    PT Frequency 2x/week  -MP     PT Plan Comments Make modifications to Pro II (ie move seat closer, have pt move at slower pace) and observe pt's tolerance or may need to discontinue.  Try shuttle with cues to prevent hyperextension.  -MP           User Key  (r) = Recorded By, (t) = Taken By, (c) = Cosigned By    Initials Name Provider Type    Colleen Duque PTA Physical Therapy Assistant                   OP Exercises     Row Name 12/23/21 0700             Subjective Comments    Subjective Comments Pt reports she was stiff this am, but it has loosened up some.  -MP              Subjective Pain    Able to rate subjective pain? yes  -MP      Pre-Treatment Pain Level 2  -MP      Post-Treatment Pain Level 2  -MP              Exercise 1    Exercise Name 1 Pro II LE- strength/endurance  -MP      Time 1 10 minutes  -MP      Additional Comments L5  -MP              Exercise 2    Exercise Name 2 B St. ITB S  -MP      Reps 2 2  -MP      Time 2 30 seconds  -MP              Exercise 3    Exercise Name 3 L LAQ  -MP      Sets 3 2  -MP      Reps 3 10  -MP      Time 3 5\" hold  -MP              Exercise 4    Exercise Name 4 Sit to/from stand  -MP      Cueing 4 Verbal  -MP      Reps 4 20  -MP      Additional Comments no UE A, cues to improve ecc control  -MP              Exercise 5    Exercise Name 5 B SL Clams  -MP      Cueing 5 Verbal; Tactile  -MP      Sets 5 2  -MP      Reps 5 10  -MP              Exercise 6    Exercise Name 6 B QS with hyperextension control  " "-MP      Cueing 6 Verbal; Tactile  -MP      Reps 6 20  -MP      Time 6 5\" hold  -MP      Additional Comments used towel under knees  -MP              Exercise 7    Exercise Name 7 L SLR  -MP      Cueing 7 Verbal; Tactile  -MP      Sets 7 2  -MP      Reps 7 10  -MP      Time 7 5\" hold  -MP              Exercise 8    Exercise Name 8 Bridges with ADD  -MP      Cueing 8 Verbal; Tactile  -MP      Sets 8 2  -MP      Reps 8 10  -MP      Time 8 5\" hold  -MP            User Key  (r) = Recorded By, (t) = Taken By, (c) = Cosigned By    Initials Name Provider Type    Colleen Duque PTA Physical Therapy Assistant                              PT OP Goals     Row Name 12/23/21 0700          PT Short Term Goals    STG 1 Patient I with HEP and have additions/changes by next recertification.  -MP     STG 1 Progress Partially Met; Ongoing  -MP     STG 2 AROM L knee flexion >= 120°.  -MP     STG 3 Patient able to perform sit to/from stand with no UE A = WB B LE x20, no increase in pain.  -MP     STG 3 Progress Partially Met  Pt able to complete but c/o increase pain in her LLE.  -MP     STG 4 Patient able to control hyperextension present in B knees  -MP     STG 5 Patient able to perform 20 SLR with  ER with  no lag present  -MP            Long Term Goals    LTG 1 B LE 5/5.  -MP     LTG 2 Patient able to ascend/descend 3 steps reciprocally with 1 hand rail assist, no increase in pain x10 reps.  -MP     LTG 3 Patient able to ambulate with no assistive device non-antalgic >= 1/4 mile.  -MP     LTG 4 Patient able to retrieve object off the floor with a squat with no increas ein pain x5 reps.  -MP     LTG 5 Improved ITB flexibility B.  -MP     LTG 6 I with final HEP.  -MP            Time Calculation    PT Goal Re-Cert Due Date 01/07/22  -MP           User Key  (r) = Recorded By, (t) = Taken By, (c) = Cosigned By    Initials Name Provider Type    Colleen Duque PTA Physical Therapy Assistant                Therapy Education  Education " Details: Added L SLR  Given: HEP  Program: New, Reinforced  How Provided: Verbal, Demonstration, Written  Provided to: Patient  Level of Understanding: Teach back education performed, Verbalized, Demonstrated              Time Calculation:   Start Time: 0745  Stop Time: 0830  Time Calculation (min): 45 min  Total Timed Code Minutes- PT: 45 minute(s)  Therapy Charges for Today     Code Description Service Date Service Provider Modifiers Qty    09723373297 HC PT THER PROC EA 15 MIN 12/23/2021 Colleen Medrano, PTA GP 3                    Colleen Medrano, ASMITA  12/23/2021

## 2021-12-27 ENCOUNTER — TELEPHONE (OUTPATIENT)
Dept: PHYSICAL THERAPY | Facility: HOSPITAL | Age: 46
End: 2021-12-27

## 2021-12-27 DIAGNOSIS — M25.552 LEFT HIP PAIN: ICD-10-CM

## 2021-12-27 DIAGNOSIS — E66.01 MORBID OBESITY WITH BMI OF 50.0-59.9, ADULT (HCC): ICD-10-CM

## 2021-12-27 DIAGNOSIS — M25.50 ARTHRALGIA OF MULTIPLE JOINTS: ICD-10-CM

## 2021-12-27 DIAGNOSIS — M25.562 LEFT KNEE PAIN, UNSPECIFIED CHRONICITY: Primary | ICD-10-CM

## 2021-12-27 NOTE — TELEPHONE ENCOUNTER
"Patient called and stated she would be canceling all appointments. \"Just don't have the money right now for therapy when i can do them at home.\"   "

## 2021-12-28 ENCOUNTER — APPOINTMENT (OUTPATIENT)
Dept: PHYSICAL THERAPY | Facility: HOSPITAL | Age: 46
End: 2021-12-28

## 2022-01-04 ENCOUNTER — APPOINTMENT (OUTPATIENT)
Dept: PHYSICAL THERAPY | Facility: HOSPITAL | Age: 47
End: 2022-01-04

## 2022-01-07 ENCOUNTER — APPOINTMENT (OUTPATIENT)
Dept: PHYSICAL THERAPY | Facility: HOSPITAL | Age: 47
End: 2022-01-07

## 2022-04-06 ENCOUNTER — DOCUMENTATION (OUTPATIENT)
Dept: PHYSICAL THERAPY | Facility: HOSPITAL | Age: 47
End: 2022-04-06

## 2022-04-06 DIAGNOSIS — M25.50 ARTHRALGIA OF MULTIPLE JOINTS: ICD-10-CM

## 2022-04-06 DIAGNOSIS — E66.01 MORBID OBESITY WITH BMI OF 50.0-59.9, ADULT: ICD-10-CM

## 2022-04-06 DIAGNOSIS — M25.552 LEFT HIP PAIN: ICD-10-CM

## 2022-04-06 DIAGNOSIS — M25.562 LEFT KNEE PAIN, UNSPECIFIED CHRONICITY: Primary | ICD-10-CM

## 2022-04-06 NOTE — THERAPY DISCHARGE NOTE
Outpatient Physical Therapy Ortho Discharge Summary       Patient Name: Ashley John  : 1975  MRN: [id number]  Today's Date: 2022      Visit Date: 2022    Visit Dx:    ICD-10-CM ICD-9-CM   1. Left knee pain, unspecified chronicity  M25.562 719.46   2. Left hip pain  M25.552 719.45   3. Arthralgia of multiple joints  M25.50 719.49   4. Morbid obesity with BMI of 50.0-59.9, adult ([facility])  E66.01 278.01    Z68.43 V85.43       Patient Active Problem List   Diagnosis   • Morbid obesity with BMI of 50.0-59.9, adult ([facility])   • Acute sinusitis   • Acute bronchitis   • Right wrist tendinitis   • Asthma   • Pain of upper abdomen   • Gastroesophageal reflux disease   • Diarrhea   • Vitamin D deficiency   • Change in bowel habits   • Early satiety   • Arthralgia of multiple joints   • Reactive airway disease with wheezing        Past Medical History:   Diagnosis Date   • Arthritis    • Asthma    • Fibrocystic breast    • Morbid obesity with BMI of 50.0-59.9, adult ([facility]) 5/10/2017        Past Surgical History:   Procedure Laterality Date   • COLONOSCOPY N/A 2019    Procedure: COLONOSCOPY--bx;  Surgeon: Jayme Poon MD;  Location: [facility] ENDOSCOPY;  Service: Gastroenterology   • ENDOSCOPY N/A 2019    Procedure: ESOPHAGOGASTRODUODENOSCOPY--bx;  Surgeon: Jayme Poon MD;  Location: [facility] ENDOSCOPY;  Service: Gastroenterology   • UPPER GASTROINTESTINAL ENDOSCOPY  2019                                                   PT OP Goals     Row Name 22 1400          PT Short Term Goals    STG 1 Patient I with HEP and have additions/changes by next recertification.  -     STG 1 Progress Partially Met;Ongoing  -     STG 2 AROM L knee flexion >= 120°.  -     STG 3 Patient able to perform sit to/from stand with no UE A = WB B LE x20, no increase in pain.  -     STG 3 Progress Partially Met  Pt able to complete but c/o increase pain in her LLE.  -     STG 4 Patient able to  control hyperextension present in B knees  -     STG 5 Patient able to perform 20 SLR with  ER with  no lag present  -            Long Term Goals    LTG 1 B LE 5/5.  -     LTG 2 Patient able to ascend/descend 3 steps reciprocally with 1 hand rail assist, no increase in pain x10 reps.  -     LTG 3 Patient able to ambulate with no assistive device non-antalgic >= 1/4 mile.  -     LTG 4 Patient able to retrieve object off the floor with a squat with no increas ein pain x5 reps.  -     LTG 5 Improved ITB flexibility B.  -     LTG 6 I with final HEP.  -           User Key  (r) = Recorded By, (t) = Taken By, (c) = Cosigned By    Initials Name Provider Type    Kelle Fu PTA Physical Therapist Assistant                           OP PT Discharge Summary  Date of Discharge: 04/06/22  Reason for Discharge: Non-compliant  Outcomes Achieved: Refer to plan of care for updates on goals achieved  Discharge Destination: Unknown  Discharge Instructions/Additional Comments: patient did not return for any further visits      Kelle Turpin PTA  4/6/2022

## 2024-04-15 NOTE — PROGRESS NOTES
Patient called this morning complaining of severe abd cramps after eating a couple of hamburger patties with baked beans last night. She states that she has had to call off of work today due to the pain. Recommend she reschedule her hida scan that has been rescheduled for her for March 19 at 7:00. Patient voiced understanding.    Physical Therapy    Patient not seen in therapy.     Unavailable due to request no therapy (10:39: pt reports just transfering back into bed to rest. Pt very agreeable for writer to re-attempt again. EDG scheduled at 15:00 today.).      Re-attempt plan: per established plan of care    13:06: Second attempt to complete PT tx with pt requesting to rest. Pt reports he has been ambulating frequently to the restroom for bowel prep. Will continue efforts tomorrow.         OBJECTIVE                            Therapy procedure time and total treatment time can be found documented on the Time Entry flowsheet

## (undated) DEVICE — SINGLE-USE BIOPSY FORCEPS: Brand: RADIAL JAW 4

## (undated) DEVICE — TRAP SXN POLYP QUICKCATCH LF

## (undated) DEVICE — PAD GRND REM POLYHESIVE A/ DISP

## (undated) DEVICE — Device: Brand: DISPOSABLE ELECTROSURGICAL SNARE